# Patient Record
Sex: MALE | Race: WHITE | Employment: FULL TIME | URBAN - METROPOLITAN AREA
[De-identification: names, ages, dates, MRNs, and addresses within clinical notes are randomized per-mention and may not be internally consistent; named-entity substitution may affect disease eponyms.]

---

## 2017-05-01 ENCOUNTER — OFFICE VISIT (OUTPATIENT)
Dept: URGENT CARE | Facility: RETAIL CLINIC | Age: 42
End: 2017-05-01
Payer: COMMERCIAL

## 2017-05-01 VITALS
SYSTOLIC BLOOD PRESSURE: 129 MMHG | HEART RATE: 86 BPM | DIASTOLIC BLOOD PRESSURE: 70 MMHG | OXYGEN SATURATION: 97 % | TEMPERATURE: 98.4 F

## 2017-05-01 DIAGNOSIS — L23.5 ALLERGIC DERMATITIS DUE TO OTHER CHEMICAL PRODUCT: Primary | ICD-10-CM

## 2017-05-01 PROCEDURE — 99213 OFFICE O/P EST LOW 20 MIN: CPT | Performed by: NURSE PRACTITIONER

## 2017-05-01 RX ORDER — PREDNISONE 20 MG/1
TABLET ORAL
Qty: 21 TABLET | Refills: 0 | Status: SHIPPED | OUTPATIENT
Start: 2017-05-01 | End: 2017-05-29

## 2017-05-01 NOTE — MR AVS SNAPSHOT
"              After Visit Summary   2017    Aleks Sumner    MRN: [id number]           Patient Information     Date Of Birth          1975        Visit Information        Provider Department      2017 5:40 PM Ned Camara APRN [facility]        Today's Diagnoses     Allergic dermatitis due to other chemical product    -  1       Follow-ups after your visit        Who to contact     You can reach your care team any time of the day by calling [phone].  Notification of test results:  If you have an abnormal lab result, we will notify you by phone as soon as possible.         Additional Information About Your Visit        MyChart Information     [employer] lets you send messages to your doctor, view your test results, renew your prescriptions, schedule appointments and more. To sign up, go to [email or web address] . Click on \"Log in\" on the left side of the screen, which will take you to the Welcome page. Then click on \"Sign up Now\" on the right side of the page.     You will be asked to enter the access code listed below, as well as some personal information. Please follow the directions to create your username and password.     Your access code is: [email or web address]  Expires: 2017  6:55 PM     Your access code will  in 90 days. If you need help or a new code, please call your [place] clinic or [phone].        Care EveryWhere ID     This is your [facility] EveryWhere ID. This could be used by other organizations to access your [place] medical records  [id number]        Your Vitals Were     Pulse Temperature Pulse Oximetry             86 98.4  F (36.9  C) (Oral) 97%          Blood Pressure from Last 3 Encounters:   17 129/70   16 130/76   16 114/68    Weight from Last 3 Encounters:   16 180 lb 9.6 oz (81.9 kg)   16 172 lb (78 kg)   05/14/15 177 lb (80.3 kg)              Today, you had the following     No orders found for " display         Today's Medication Changes          These changes are accurate as of: 5/1/17  6:55 PM.  If you have any questions, ask your nurse or doctor.               Start taking these medicines.        Dose/Directions    predniSONE 20 MG tablet   Commonly known as:  DELTASONE   Used for:  Allergic dermatitis due to other chemical product   Started by:  Ned Camara APRN CNP        Take 3 tablets for 3 days, then take 2 tablets for 3 days, then 1 tablet for 4 days, then 1/2 a tablet for 4 days.   Quantity:  21 tablet   Refills:  0            Where to get your medicines      These medications were sent to [address]  [address], [facility] [address]     Phone:  [phone]     predniSONE 20 MG tablet                Primary Care Provider Office Phone # Fax #    Beni Murrieta [phone]       [facility] [address] [place] [address]        Thank you!     Thank you for choosing [facility]  for your care. Our goal is always to provide you with excellent care. Hearing back from our patients is one way we can continue to improve our services. Please take a few minutes to complete the written survey that you may receive in the mail after your visit with us. Thank you!             Your Updated Medication List - Protect others around you: Learn how to safely use, store and throw away your medicines at www.disposemymeds.org.          This list is accurate as of: 5/1/17  6:55 PM.  Always use your most recent med list.                   Brand Name Dispense Instructions for use    predniSONE 20 MG tablet    DELTASONE    21 tablet    Take 3 tablets for 3 days, then take 2 tablets for 3 days, then 1 tablet for 4 days, then 1/2 a tablet for 4 days.

## 2017-05-01 NOTE — LETTER
[facility]  [address]  [facility] [address]  Phone: [phone]    May 1, 2017        Aleks Sumner  [address]  [facility] [address]          To whom it may concern:    RE: Aleks Sumner    Patient was seen and treated today at our clinic.  Patient is very likely having a reaction to a chemical exposure and needs to wear protection (physical & respiratory) if he is to continue working with this exposure.     Please contact me for questions or concerns.      Sincerely,        Ned Camara MSN, APRN, Family NP-C  [facility]

## 2017-05-01 NOTE — NURSING NOTE
"Chief Complaint   Patient presents with     Derm Problem     rash on arms for about a week       Initial /70  Pulse 86  Temp 98.4  F (36.9  C) (Oral)  SpO2 97% Estimated body mass index is 25.19 kg/(m^2) as calculated from the following:    Height as of 5/12/16: 5' 11\" (1.803 m).    Weight as of 5/12/16: 180 lb 9.6 oz (81.9 kg).  Medication Reconciliation: complete   Ria Durham      "

## 2017-05-01 NOTE — PROGRESS NOTES
[facility] note    SUBJECTIVE:  Aleks Sumner is a 41 year old male who presents to [facility] with chief complaint of a rash.  Onset of rash was 1 week(s) ago.   Rash is gradual onset and worsening.  Location of the rash: arms and face (eyelids were even swollen, mild in a few other areas.  Quality/symptoms of rash: itching and redness   Symptoms are mild and moderate and rash seems to be worsening.  Previous history of a similar rash? No  Recent exposure history: chemical exposure.    Associated symptoms include: itches & seems to be spreading.    No current outpatient prescriptions on file.     No current facility-administered medications for this visit.        PAST MEDICAL HISTORY: No past medical history on file.    PAST SURGICAL HISTORY:   Past Surgical History:   Procedure Laterality Date     APPENDECTOMY         FAMILY HISTORY:   Family History   Problem Relation Age of Onset     Depression Mother      HEART DISEASE Maternal Grandfather        SOCIAL HISTORY:   Social History   Substance Use Topics     Smoking status: Current Every Day Smoker     Packs/day: 1.00     Types: Cigarettes     Smokeless tobacco: Former User     Alcohol use 0.0 oz/week     0 Standard drinks or equivalent per week      Comment: socially, not heavy         ROS:  Review of systems negative except as stated above.    EXAM:   Vitals:    05/01/17 1813   BP: 129/70   Pulse: 86   Temp: 98.4  F (36.9  C)   TempSrc: Oral   SpO2: 97%     GENERAL: alert, no acute distress.  SKIN: Rash description:    Distribution: generalized  Location: mostly arms & face (including eye lids) and mild other areas.    Color: red,  Lesion type: maculopapular.  GENERAL APPEARANCE: alert, moderate distress and cooperative  EYES: EOMI,  PERRL, conjunctiva clear  NECK: supple, non-tender to palpation, no adenopathy noted    ASSESSMENT:  Allergic dermatitis due to other chemical  product      PLAN:  Prednisolone   Treatment of pruritus can be difficult and often frustrating. Specific treatments exist for some, but not all.  Antihistamines are the most widely utilized agents for pruritus. (such as Benadryl & Zyrtec).  Appropriate skin care is imperative.  Avoidance of scratching, and therefore secondary skin irritation and perpetuation of the itch-scratch cycle, is also important.    Avoidance of contact irritants such as wool clothing, cleansing agents, and pet dander may be helpful.    Many forms of pruritus can be worsened by dry skin and may improve with treatment for dry skin, including use of a humidifier, maintaining a cool environment, avoiding hot baths/showers, and using only mild soaps.      Lubrication options discussed.    Topical antipruritics such as a camphor-based lotion or oatmeal baths may offer temporary relief.  OTC Treatments were reviewed with Aleks Sumner  Patient informed to F/U with PCP if symptoms worsen or do not resolve.    Information on the above diagnosis was given to the patient.  Observe for signs of superimposed infection and systemic symptoms  Watch for signs of fever or worsening of the rash.    If not improving Follow up at:  [facility] [phone]    Ned Camara MSN, APRN, Family NP-C  [facility]

## 2017-05-29 ENCOUNTER — HOSPITAL ENCOUNTER (EMERGENCY)
Facility: CLINIC | Age: 42
Discharge: HOME | End: 2017-05-29
Attending: PHYSICIAN ASSISTANT | Admitting: PHYSICIAN ASSISTANT
Payer: COMMERCIAL

## 2017-05-29 VITALS
HEART RATE: 57 BPM | RESPIRATION RATE: 12 BRPM | TEMPERATURE: 98 F | BODY MASS INDEX: 23.7 KG/M2 | OXYGEN SATURATION: 98 % | DIASTOLIC BLOOD PRESSURE: 71 MMHG | HEIGHT: 72 IN | WEIGHT: 175 LBS | SYSTOLIC BLOOD PRESSURE: 124 MMHG

## 2017-05-29 DIAGNOSIS — Z23 NEED FOR VACCINATION: ICD-10-CM
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
## 2017-05-29 DIAGNOSIS — S81.011A: ICD-10-CM

## 2017-05-29 PROCEDURE — 90471 IMMUNIZATION ADMIN: CPT | Performed by: PHYSICIAN ASSISTANT

## 2017-05-29 PROCEDURE — 99283 EMERGENCY DEPT VISIT LOW MDM: CPT | Mod: 25 | Performed by: PHYSICIAN ASSISTANT

## 2017-05-29 PROCEDURE — 90715 TDAP VACCINE 7 YRS/> IM: CPT | Performed by: PHYSICIAN ASSISTANT

## 2017-05-29 PROCEDURE — 12001 RPR S/N/AX/GEN/TRNK 2.5CM/<: CPT | Mod: Z6 | Performed by: PHYSICIAN ASSISTANT

## 2017-05-29 PROCEDURE — 12001 RPR S/N/AX/GEN/TRNK 2.5CM/<: CPT | Performed by: PHYSICIAN ASSISTANT

## 2017-05-29 PROCEDURE — 99283 EMERGENCY DEPT VISIT LOW MDM: CPT | Mod: Z6 | Performed by: PHYSICIAN ASSISTANT

## 2017-05-29 PROCEDURE — 25000125 ZZHC RX 250: Performed by: PHYSICIAN ASSISTANT

## 2017-05-29 RX ORDER — LIDOCAINE HYDROCHLORIDE AND EPINEPHRINE 10; 10 MG/ML; UG/ML
1 INJECTION, SOLUTION INFILTRATION; PERINEURAL ONCE
Status: COMPLETED | OUTPATIENT
Start: 2017-05-29 | End: 2017-05-29

## 2017-05-29 RX ADMIN — CLOSTRIDIUM TETANI TOXOID ANTIGEN (FORMALDEHYDE INACTIVATED), CORYNEBACTERIUM DIPHTHERIAE TOXOID ANTIGEN (FORMALDEHYDE INACTIVATED), BORDETELLA PERTUSSIS TOXOID ANTIGEN (GLUTARALDEHYDE INACTIVATED), BORDETELLA PERTUSSIS FILAMENTOUS HEMAGGLUTININ ANTIGEN (FORMALDEHYDE INACTIVATED), BORDETELLA PERTUSSIS PERTACTIN ANTIGEN, AND BORDETELLA PERTUSSIS FIMBRIAE 2/3 ANTIGEN 0.5 ML: 5; 2; 2.5; 5; 3; 5 INJECTION, SUSPENSION INTRAMUSCULAR at 14:22

## 2017-05-29 RX ADMIN — LIDOCAINE HYDROCHLORIDE,EPINEPHRINE BITARTRATE 1 ML: 10; .01 INJECTION, SOLUTION INFILTRATION; PERINEURAL at 14:21

## 2017-05-29 ASSESSMENT — ENCOUNTER SYMPTOMS
WEAKNESS: 0
NUMBNESS: 0
ARTHRALGIAS: 0
WOUND: 1

## 2017-05-29 NOTE — ED AVS SNAPSHOT
[facility] Emergency Department    [address] [place] [address]    Phone:  [phone]    Fax:  [phone]                                       Aleks Sumner   MRN: [id number]    Department:  [facility] Emergency Department   Date of Visit:  5/29/2017           Patient Information     Date Of Birth          1975        Your diagnoses for this visit were:     Laceration of skin of right knee without complication, initial encounter        You were seen by Roxanne Espitia PA-C.      Follow-up Information     Follow up with Beni Murrieta MD In 8 days.    Specialty:  Family Practice    Why:  For suture removal    Contact information:    [facility]  [address] [place] [address] [phone]          Follow up with [facility] Emergency Department.    Specialty:  EMERGENCY MEDICINE    Why:  If symptoms worsen    Contact information:    [address] [place] [address] [phone]    Additional information:    From [address]: Exit at [address] on [address] side of [place]. Turn right on [address]. Turn left at stoplight on [address] [place]. [facility] will be in view two blocks ahead        Discharge Instructions       Keep the wound clean, dry, covered. Have the sutures removed in 8-10 days. Use ibuprofen or tylenol if you develop pain. Return to the emergency department if you develop signs of infection or if wound breaks open.    Thank you for choosing [facility]'s Emergency Department. It was a pleasure taking care of you today. If you have any questions, please call [phone].    Roxanne Espitia PA-C    Extremity Laceration: Sutures, Staples, or Tape  A laceration is a cut through the skin. If it is deep, it may require stitches (sutures) or staples to close so it can heal. Minor cuts may be treated with surgical tape closures.   X-rays may be done if something may have  entered the skin through the cut. You may also need a tetanus shot if you are not up to date on this vaccination.  Home care    Follow the health care provider s instructions on how to care for the cut.    Wash your hands with soap and warm water before and after caring for your wound. This is to help prevent infection.    Keep the wound clean and dry. If a bandage was applied and it becomes wet or dirty, replace it. Otherwise, leave it in place for the first 24 hours, then change it once a day or as directed.    If sutures or staples were used, clean the wound daily:    After removing the bandage, wash the area with soap and water. Use a wet cotton swab to loosen and remove any blood or crust that forms.    After cleaning, keep the wound clean and dry. Talk with your doctor before applying any antibiotic ointment to the wound. Reapply the bandage.    You may remove the bandage to shower as usual after the first 24 hours, but do not soak the area in water (no swimming) until the stitches or staples are removed.    If surgical tape closures were used, keep the area clean and dry. If it becomes wet, blot it dry with a towel.    The doctor may prescribe an antibiotic cream or ointment to prevent infection. Do not stop taking this medication until you have finished the prescribed course or the doctor tells you to stop. The doctor may also prescribe medications for pain. Follow the doctor s instructions for taking these medications.    Avoid activities that may reopen your wound.  Follow-up care  Follow up with your health care provider. Most skin wounds heal within ten days. However, an infection may sometimes occur despite proper treatment. Therefore, check the wound daily for the signs of infection listed below. Stitches and staples should be removed within 7-14 days. If surgical tape closures were used, you may remove them after 10 days if they have not fallen off by then.   When to seek medical advice  Call your  health care provider right away if any of these occur:    Wound bleeding not controlled by direct pressure    Signs of infection, including increasing pain in the wound, increasing wound redness or swelling, or pus or bad odor coming from the wound    Fever of 100.4 F (38 C) or higher or as directed by your healthcare provider    Stitches or staples come apart or fall out or surgical tape falls off before 7 days    Wound edges re-open    Wound changes colors    Numbness around the wound     Decreased movement around the injured area        24 Hour Appointment Hotline       To make an appointment at any [facility] clinic, call [phone] ([phone]). If you don't have a family doctor or clinic, we will help you find one. [facility] clinics are conveniently located to serve the needs of you and your family.             Review of your medicines      Notice     You have not been prescribed any medications.            Orders Needing Specimen Collection     None      Pending Results     No orders found from 5/27/2017 to 5/30/2017.            Pending Culture Results     No orders found from 5/27/2017 to 5/30/2017.            Pending Results Instructions     If you had any lab results that were not finalized at the time of your Discharge, you can call the ED Lab Result RN at [phone]. You will be contacted by this team for any positive Lab results or changes in treatment. The nurses are available 7 days a week from 10A to 6:30P.  You can leave a message 24 hours per day and they will return your call.        Thank you for choosing [facility]       Thank you for choosing [facility] for your care. Our goal is always to provide you with excellent care. Hearing back from our patients is one way we can continue to improve our services. Please take a few minutes to complete the written survey that you may receive in the mail after you visit with us. Thank you!        [employer] [facility] Information     [employer] lets you send messages to  "your doctor, view your test results, renew your prescriptions, schedule appointments and more. To sign up, go to [email or web address] . Click on \"Log in\" on the left side of the screen, which will take you to the Welcome page. Then click on \"Sign up Now\" on the right side of the page.     You will be asked to enter the access code listed below, as well as some personal information. Please follow the directions to create your username and password.     Your access code is: [email or web address]  Expires: 2017  6:55 PM     Your access code will  in 90 days. If you need help or a new code, please call your [place] clinic or [phone].        Care EveryWhere ID     This is your Care EveryWhere ID. This could be used by other organizations to access your [place] medical records  [id number]        After Visit Summary       This is your record. Keep this with you and show to your community pharmacist(s) and doctor(s) at your next visit.                  "

## 2017-05-29 NOTE — DISCHARGE INSTRUCTIONS
Keep the wound clean, dry, covered. Have the sutures removed in 8-10 days. Use ibuprofen or tylenol if you develop pain. Return to the emergency department if you develop signs of infection or if wound breaks open.    Thank you for choosing [facility]'s Emergency Department. It was a pleasure taking care of you today. If you have any questions, please call [phone].    Roxanne Espitia PA-C    Extremity Laceration: Sutures, Staples, or Tape  A laceration is a cut through the skin. If it is deep, it may require stitches (sutures) or staples to close so it can heal. Minor cuts may be treated with surgical tape closures.   X-rays may be done if something may have entered the skin through the cut. You may also need a tetanus shot if you are not up to date on this vaccination.  Home care    Follow the health care provider s instructions on how to care for the cut.    Wash your hands with soap and warm water before and after caring for your wound. This is to help prevent infection.    Keep the wound clean and dry. If a bandage was applied and it becomes wet or dirty, replace it. Otherwise, leave it in place for the first 24 hours, then change it once a day or as directed.    If sutures or staples were used, clean the wound daily:    After removing the bandage, wash the area with soap and water. Use a wet cotton swab to loosen and remove any blood or crust that forms.    After cleaning, keep the wound clean and dry. Talk with your doctor before applying any antibiotic ointment to the wound. Reapply the bandage.    You may remove the bandage to shower as usual after the first 24 hours, but do not soak the area in water (no swimming) until the stitches or staples are removed.    If surgical tape closures were used, keep the area clean and dry. If it becomes wet, blot it dry with a towel.    The doctor may prescribe an antibiotic cream or ointment to prevent infection. Do not stop taking this medication until you  have finished the prescribed course or the doctor tells you to stop. The doctor may also prescribe medications for pain. Follow the doctor s instructions for taking these medications.    Avoid activities that may reopen your wound.  Follow-up care  Follow up with your health care provider. Most skin wounds heal within ten days. However, an infection may sometimes occur despite proper treatment. Therefore, check the wound daily for the signs of infection listed below. Stitches and staples should be removed within 7-14 days. If surgical tape closures were used, you may remove them after 10 days if they have not fallen off by then.   When to seek medical advice  Call your health care provider right away if any of these occur:    Wound bleeding not controlled by direct pressure    Signs of infection, including increasing pain in the wound, increasing wound redness or swelling, or pus or bad odor coming from the wound    Fever of 100.4 F (38 C) or higher or as directed by your healthcare provider    Stitches or staples come apart or fall out or surgical tape falls off before 7 days    Wound edges re-open    Wound changes colors    Numbness around the wound     Decreased movement around the injured area

## 2017-05-29 NOTE — ED AVS SNAPSHOT
[facility] Emergency Department    [address] [place] [address]    Phone:  [phone]    Fax:  [phone]                                       Aleks Sumner   MRN: [id number]    Department:  [facility] Emergency Department   Date of Visit:  5/29/2017           After Visit Summary Signature Page     I have received my discharge instructions, and my questions have been answered. I have discussed any challenges I see with this plan with the nurse or doctor.    ..........................................................................................................................................  Patient/Patient Representative Signature      ..........................................................................................................................................  Patient Representative Print Name and Relationship to Patient    ..................................................               ................................................  Date                                            Time    ..........................................................................................................................................  Reviewed by Signature/Title    ...................................................              ..............................................  Date                                                            Time

## 2017-05-29 NOTE — ED PROVIDER NOTES
History     Chief Complaint   Patient presents with     Laceration     HPI  Aleks Sumner is a 41 year old male who resents to the emergency department with a laceration to his right leg.  He was at home working outside on yard chores when he accidentally cut himself with his utility knife.  Bleeding was controlled with direct pressure.  Laceration is located just superior to his right knee.  He reports full mobility of the knee without pain. Last tetanus 2008.    I have reviewed the Medications, Allergies, Past Medical and Surgical History, and Social History in the Epic system.    Review of Systems   Musculoskeletal: Negative for arthralgias.   Skin: Positive for wound (right knee).   Neurological: Negative for weakness and numbness.   All other systems reviewed and are negative.      Physical Exam   BP: 124/71  Pulse: 57  Temp: 98  F (36.7  C)  Resp: 12  Height: 182.9 cm (6')  Weight: 79.4 kg (175 lb)  SpO2: 98 %  Physical Exam   Constitutional: He is oriented to person, place, and time. He appears well-developed and well-nourished. No distress.   HENT:   Head: Normocephalic and atraumatic.   Cardiovascular: Intact distal pulses.    Pulmonary/Chest: Effort normal. No respiratory distress.   Musculoskeletal:        Right knee: He exhibits laceration. He exhibits normal range of motion (full strength of knee, full ROM without pain) and no effusion. No tenderness found.        Legs:  Neurological: He is alert and oriented to person, place, and time.   Skin: Skin is warm and dry. He is not diaphoretic.   Psychiatric: He has a normal mood and affect.       ED Course     ED Course     Laceration repair  Date/Time: 5/29/2017 2:50 PM  Performed by: BRAD TATUM  Authorized by: BRAD TATUM   Consent: Verbal consent obtained.  Consent given by: patient  Body area: lower extremity  Location details: left knee  Laceration length: 2 cm  Foreign bodies: no foreign bodies  Tendon involvement:  none  Nerve involvement: none  Anesthesia: local infiltration    Anesthesia:  Anesthesia: local infiltration  Local Anesthetic: lidocaine 1% with epinephrine   Anesthetic total: 2 mL  Sedation:  Patient sedated: no    Preparation: Patient was prepped and draped in the usual sterile fashion.  Irrigation solution: saline  Amount of cleaning: standard  Debridement: none  Degree of undermining: none  Skin closure: Ethilon (4-0)  Number of sutures: 5  Technique: simple  Approximation: close  Approximation difficulty: simple  Dressing: 4x4 sterile gauze  Patient tolerance: Patient tolerated the procedure well with no immediate complications            Medications   lidocaine 1% with EPINEPHrine 1:100,000 injection 1 mL (1 mL Intradermal Given by Other 5/29/17 7979)   Tdap (tetanus-diphtheria-acell pertussis) (ADACEL) injection 0.5 mL (0.5 mLs Intramuscular Given 5/29/17 3772)        Assessments & Plan (with Medical Decision Making)  Aleks Sumner is a 41 year old male who presented to the emergency department with a laceration to his right leg.  He accidentally cut himself with a utility knife while working on [employer] for a garden. Bleeding was controlled prior to arrival.  On exam he had a 2 cm laceration just superior to his anterior/medial knee.  He had no evidence for joint involvement or tendon involvement.  He was neurovascularly intact distal to the injury.  This did require repair and this was performed as detailed above.  I advised to have the sutures removed in 8-10 days in the clinic.  I discussed wound cares with the patient and indications of when he should return to the emergency department.  All questions were answered and the patient was comfortable with discharge at this time.       I have reviewed the nursing notes.    I have reviewed the findings, diagnosis, plan and need for follow up with the patient.    New Prescriptions    No medications on file       Final diagnoses:   Laceration of skin  of right knee without complication, initial encounter       5/29/2017   [facility] EMERGENCY DEPARTMENT     Roxanne Espitia PA-C  05/29/17 2332

## 2018-10-15 ENCOUNTER — OFFICE VISIT (OUTPATIENT)
Dept: FAMILY MEDICINE | Facility: CLINIC | Age: 43
End: 2018-10-15
Payer: COMMERCIAL

## 2018-10-15 VITALS
HEIGHT: 71 IN | HEART RATE: 87 BPM | OXYGEN SATURATION: 98 % | BODY MASS INDEX: 23.29 KG/M2 | DIASTOLIC BLOOD PRESSURE: 70 MMHG | WEIGHT: 166.38 LBS | TEMPERATURE: 97.7 F | RESPIRATION RATE: 16 BRPM | SYSTOLIC BLOOD PRESSURE: 122 MMHG

## 2018-10-15 DIAGNOSIS — M67.442 GANGLION CYST OF FINGER OF LEFT HAND: ICD-10-CM

## 2018-10-15 DIAGNOSIS — Z13.220 LIPID SCREENING: ICD-10-CM

## 2018-10-15 DIAGNOSIS — Z13.1 SCREENING FOR DIABETES MELLITUS: ICD-10-CM

## 2018-10-15 DIAGNOSIS — R20.2 PARESTHESIA OF RIGHT ARM: ICD-10-CM

## 2018-10-15 DIAGNOSIS — Z00.00 ENCOUNTER FOR ROUTINE ADULT HEALTH EXAMINATION WITHOUT ABNORMAL FINDINGS: Primary | ICD-10-CM

## 2018-10-15 LAB
CHOLEST SERPL-MCNC: 161 MG/DL
GLUCOSE SERPL-MCNC: 55 MG/DL (ref 70–99)
HDLC SERPL-MCNC: 62 MG/DL
LDLC SERPL CALC-MCNC: 74 MG/DL
NONHDLC SERPL-MCNC: 99 MG/DL
TRIGL SERPL-MCNC: 124 MG/DL

## 2018-10-15 PROCEDURE — 82947 ASSAY GLUCOSE BLOOD QUANT: CPT | Performed by: FAMILY MEDICINE

## 2018-10-15 PROCEDURE — 80061 LIPID PANEL: CPT | Performed by: FAMILY MEDICINE

## 2018-10-15 PROCEDURE — 36415 COLL VENOUS BLD VENIPUNCTURE: CPT | Performed by: FAMILY MEDICINE

## 2018-10-15 PROCEDURE — 99213 OFFICE O/P EST LOW 20 MIN: CPT | Mod: 25 | Performed by: FAMILY MEDICINE

## 2018-10-15 PROCEDURE — 99396 PREV VISIT EST AGE 40-64: CPT | Performed by: FAMILY MEDICINE

## 2018-10-15 ASSESSMENT — PAIN SCALES - GENERAL: PAINLEVEL: NO PAIN (0)

## 2018-10-15 NOTE — MR AVS SNAPSHOT
After Visit Summary   10/15/2018    Aleks Sumner    MRN: [id number]           Patient Information     Date Of Birth          1975        Visit Information        Provider Department      10/15/2018 4:20 PM Beni Murrieta MD [facility]        Today's Diagnoses     Encounter for routine adult health examination without abnormal findings    -  1    Lipid screening        Screening for diabetes mellitus        Paresthesia of right arm        Ganglion cyst of finger of left hand          Care Instructions      Preventive Health Recommendations  Male Ages 40 to 49    Yearly exam:             See your health care provider every year in order to  o   Review health changes.   o   Discuss preventive care.    o   Review your medicines if your doctor has prescribed any.    You should be tested each year for STDs (sexually transmitted diseases) if you re at risk.     Have a cholesterol test every 5 years.     Have a colonoscopy (test for colon cancer) if someone in your family has had colon cancer or polyps before age 50.     After age 45, have a diabetes test (fasting glucose). If you are at risk for diabetes, you should have this test every 3 years.      Talk with your health care provider about whether or not a prostate cancer screening test (PSA) is right for you.    Shots: Get a flu shot each year. Get a tetanus shot every 10 years.     Nutrition:    Eat at least 5 servings of fruits and vegetables daily.     Eat whole-grain bread, whole-wheat pasta and brown rice instead of white grains and rice.     Get adequate Calcium and Vitamin D.     Lifestyle    Exercise for at least 150 minutes a week (30 minutes a day, 5 days a week). This will help you control your weight and prevent disease.     Limit alcohol to one drink per day.     No smoking.     Wear sunscreen to prevent skin cancer.     See your dentist every six months for an exam and cleaning.              Follow-ups  after your visit        Additional Services     ORTHOPEDICS ADULT REFERRAL       Your provider has referred you to: [facility]: [facility] [phone]   [email or web address]    Please be aware that coverage of these services is subject to the terms and limitations of your health insurance plan.  Call member services at your health plan with any benefit or coverage questions.      Please bring the following to your appointment:    >>   Any x-rays, CTs or MRIs which have been performed.  Contact the facility where they were done to arrange for  prior to your scheduled appointment.    >>   List of current medications   >>   This referral request   >>   Any documents/labs given to you for this referral                  Your next 10 appointments already scheduled     Oct 22, 2018  2:40 PM CDT   New Visit with Denny Moreira MD   [facility]    [address] [place] [id number] [phone]              Who to contact     If you have questions or need follow up information about today's clinic visit or your schedule please contact [facility] directly at [phone].  Normal or non-critical lab and imaging results will be communicated to you by MyChart, letter or phone within 4 business days after the clinic has received the results. If you do not hear from us within 7 days, please contact the clinic through MyChart or phone. If you have a critical or abnormal lab result, we will notify you by phone as soon as possible.  Submit refill requests through [employer] or call your pharmacy and they will forward the refill request to us. Please allow 3 business days for your refill to be completed.          Additional Information About Your Visit        Care EveryWhere ID     This is your Care EveryWhere ID. This could be used by other organizations to access your [facility]  "records  [id number]        Your Vitals Were     Pulse Temperature Respirations Height Pulse Oximetry BMI (Body Mass Index)    87 97.7  F (36.5  C) (Temporal) 16 5' 11\" (1.803 m) 98% 23.2 kg/m2       Blood Pressure from Last 3 Encounters:   10/15/18 122/70   05/29/17 124/71   05/01/17 129/70    Weight from Last 3 Encounters:   10/15/18 166 lb 6 oz (75.5 kg)   05/29/17 175 lb (79.4 kg)   05/12/16 180 lb 9.6 oz (81.9 kg)              We Performed the Following     GLUCOSE     Lipid panel reflex to direct LDL Non-fasting     ORTHOPEDICS ADULT REFERRAL        Primary Care Provider Office Phone # Fax #    Beni Murrieta [phone]       [address] [place] [address]        Equal Access to Services     [facility]: Hadii aad ku hadasho Soomaali, waaxda luqadaha, qaybta kaalmada adeegyada, waxay trippin haylocn tori garcia laBranaan . So [facility] [phone].    ATENCIÓN: Si habla español, tiene a blanc disposición servicios gratuitos de asistencia lingüística. Chalo al [phone].    We comply with applicable federal civil rights laws and [place] laws. We do not discriminate on the basis of race, color, national origin, age, disability, sex, sexual orientation, or gender identity.            Thank you!     Thank you for choosing [facility]  for your care. Our goal is always to provide you with excellent care. Hearing back from our patients is one way we can continue to improve our services. Please take a few minutes to complete the written survey that you may receive in the mail after your visit with us. Thank you!             Your Updated Medication List - Protect others around you: Learn how to safely use, store and throw away your medicines at www.disposemymeds.org.      Notice  As of 10/15/2018 11:59 PM    You have not been prescribed any medications.      "

## 2018-10-15 NOTE — PROGRESS NOTES
SUBJECTIVE:   CC: Aleks Sumner is an 43 year old male who presents for preventative health visit.     Healthy Habits:    Do you get at least three servings of calcium containing foods daily (dairy, green leafy vegetables, etc.)? yes and no, taking calcium and/or vitamin D supplement: no    Amount of exercise or daily activities, outside of work: 5 day(s) per week    Problems taking medications regularly No    Medication side effects: No    Have you had an eye exam in the past two years? yes    Do you see a dentist twice per year? yes    Do you have sleep apnea, excessive snoring or daytime drowsiness?no     Patient has been having a burning sensation around his elbows and forearm area for maybe 6 months   PROBLEMS TO ADD ON...  Some burning sensation in his forearms bilaterally right worse than the left.      Today's PHQ-2 Score:   PHQ-2 ( 1999 [employer]) 5/14/2015 9/3/2013   Q1: Little interest or pleasure in doing things 0 0   Q2: Feeling down, depressed or hopeless 0 0   PHQ-2 Score 0 0       Abuse: Current or Past(Physical, Sexual or Emotional)- No  Do you feel safe in your environment - Yes    Social History   Substance Use Topics     Smoking status: Current Every Day Smoker     Packs/day: 1.00     Types: Cigarettes     Smokeless tobacco: Former User     Alcohol use 0.0 oz/week     0 Standard drinks or equivalent per week      Comment: socially, not heavy      If you drink alcohol do you typically have >3 drinks per day or >7 drinks per week? No                      Last PSA: No results found for: PSA    Reviewed orders with patient. Reviewed health maintenance and updated orders accordingly - Yes  Labs reviewed in EPIC  BP Readings from Last 3 Encounters:   10/15/18 122/70   05/29/17 124/71   05/01/17 129/70    Wt Readings from Last 3 Encounters:   10/15/18 166 lb 6 oz (75.5 kg)   05/29/17 175 lb (79.4 kg)   05/12/16 180 lb 9.6 oz (81.9 kg)                  Patient Active Problem List   Diagnosis      Tobacco use disorder     Past Surgical History:   Procedure Laterality Date     APPENDECTOMY  1983    ruptured appy       Social History   Substance Use Topics     Smoking status: Current Every Day Smoker     Packs/day: 1.00     Types: Cigarettes     Smokeless tobacco: Former User     Alcohol use 0.0 oz/week     0 Standard drinks or equivalent per week      Comment: socially, not heavy     Family History   Problem Relation Age of Onset     Depression Mother      65     HEART DISEASE Maternal Grandfather      Other - See Comments Father      66     Other - See Comments Sister      3 yrs younger     Diabetes Paternal Uncle      Obesity Paternal Uncle      Other - See Comments Sister      10 yrs younger     Other - See Comments Daughter      2010     Other - See Comments Son      2011     Other - See Comments Son      2013     Alzheimer Disease Maternal Grandmother      Alzheimer Disease Paternal Grandmother      Other - See Comments Daughter      2001, from another marriage         No current outpatient prescriptions on file.     No Known Allergies  Recent Labs   Lab Test  10/15/18   1728   LDL  74   HDL  62   TRIG  124        Reviewed and updated as needed this visit by clinical staff  Tobacco  Allergies  Med Hx  Surg Hx  Fam Hx  Soc Hx        Reviewed and updated as needed this visit by Provider        Past Medical History:   Diagnosis Date     NO ACTIVE PROBLEMS       Past Surgical History:   Procedure Laterality Date     APPENDECTOMY  1983    ruptured appy       ROS:  CONSTITUTIONAL: NEGATIVE for fever, chills, change in weight  INTEGUMENTARY/SKIN: NEGATIVE for worrisome rashes, moles or lesions  EYES: NEGATIVE for vision changes or irritation  ENT: NEGATIVE for ear, mouth and throat problems  RESP: NEGATIVE for significant cough or SOB  CV: NEGATIVE for chest pain, palpitations or peripheral edema  GI: NEGATIVE for nausea, abdominal pain, heartburn, or change in bowel habits   male: negative for  "dysuria, hematuria, decreased urinary stream, erectile dysfunction, urethral discharge  MUSCULOSKELETAL: He notes the burning sensation in his right upper arm that he gets at work.  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  At times he feels like his strength is a little bit decreased particularly with grasp but he has not been dropping any equipment or tools while at work.  Is fairly sporadic has not been a problem up to this point.  Occasionally but very rarely he will get similar symptoms on the left.  Typically they start right around the elbow region and can be along the forearm and even up into the upper arm.   NEURO: NEGATIVE for weakness, dizziness or paresthesias  PSYCHIATRIC: NEGATIVE for changes in mood or affect    OBJECTIVE:   /70 (BP Location: Left arm, Patient Position: Sitting, Cuff Size: Adult Regular)  Pulse 87  Temp 97.7  F (36.5  C) (Temporal)  Resp 16  Ht 5' 11\" (1.803 m)  Wt 166 lb 6 oz (75.5 kg)  SpO2 98%  BMI 23.2 kg/m2  EXAM:  GENERAL: healthy, alert and no distress  EYES: Eyes grossly normal to inspection, PERRL and conjunctivae and sclerae normal  HENT: ear canals and TM's normal, nose and mouth without ulcers or lesions  NECK: no adenopathy, no asymmetry, masses, or scars and thyroid normal to palpation  RESP: lungs clear to auscultation - no rales, rhonchi or wheezes  CV: regular rate and rhythm, normal S1 S2, no S3 or S4, no murmur, click or rub, no peripheral edema and peripheral pulses strong  ABDOMEN: soft, nontender, no hepatosplenomegaly, no masses and bowel sounds normal   (male): normal male genitalia without lesions or urethral discharge, no hernia  RECTAL: not indicated   MS: There is no obvious deformities or reproducible pain in the upper extremities bilaterally.  He has normal hand grasp and has a negative Tinel and Phalen bilaterally.  He does have a small cyst that is 3 or 4 mm in diameter on the left hand long finger " just distal to the PIP joint on the flexor surface.  There is no erythema.  This is just alongside it was an old scar from where he had injured this finger and had a repair done.  It may be a cyst along the flexor tendon but it really does not move when he flexes the finger.  SKIN: no suspicious lesions or rashes  NEURO: Normal strength and tone, mentation intact and speech normal  PSYCH: mentation appears normal, affect normal/bright    Diagnostic Test Results:  Results for orders placed or performed in visit on 10/15/18 (from the past 24 hour(s))   GLUCOSE   Result Value Ref Range    Glucose 55 (L) 70 - 99 mg/dL   Lipid panel reflex to direct LDL Non-fasting   Result Value Ref Range    Cholesterol 161 <200 mg/dL    Triglycerides 124 <150 mg/dL    HDL Cholesterol 62 >39 mg/dL    LDL Cholesterol Calculated 74 <100 mg/dL    Non HDL Cholesterol 99 <130 mg/dL       ASSESSMENT/PLAN:   (Z00.00) Encounter for routine adult health examination without abnormal findings  (primary encounter diagnosis)  Comment: Overall doing well.  He has lost some weight over the years and is got a normal BMI at this time.  He eats very well including a lot of fresh vegetables and fruits.  He drinks occasionally.  Plan: Encouraged him to continue with the increase fruits and vegetables.  He should not need any other dietary supplements since he is getting a good mix of foods in his diet.  He had asked specifically about a men's vitamin but I do not think it is needed at this time.    (Z13.220) Lipid screening  Comment: He is due for lipid screening even though he is not fasting today we obtained a nonfasting lab  Plan: Lipid panel reflex to direct LDL Non-fasting        His cholesterol profile is excellent can recheck in 5 years.    (Z13.1) Screening for diabetes mellitus  Comment: Due for diabetic screen since he is 43  Plan: GLUCOSE        Drawn and normal today.  Recheck every 3 years.    (R20.2) Paresthesia of right arm  Comment: I am  "not certain with this burning sensation in the arm is.  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  His exam today did not indicate any significant tightness or muscle spasm.  Plan: He has been just watch and wait and keep track of how often this is happening and if it is getting worse.  I told him to make particular note of any loss of strength and she is particularly having issues with holding onto equipment and we need to do a more aggressive evaluation.    (M67.442) Ganglion cyst of finger of left hand  Comment: Patient had an injury to his left long finger number of years ago where he was cut and repaired.  He has now noticed a small cyst developing over the palmar aspect of that finger near the PIP joint.  He has tolerated this but is getting to be more bothersome particular when he grasps a tool and there is pressure applied to it.  Plan: We will refer him to 1 of the orthopedic surgeons to have this looked at to see if that can be removed.     COUNSELING:  Reviewed preventive health counseling, as reflected in patient instructions       Regular exercise       Healthy diet/nutrition       Vision screening       Immunizations    Declined: Influenza due to Conscientious objector               Alcohol Use    BP Readings from Last 1 Encounters:   10/15/18 122/70     Estimated body mass index is 23.2 kg/(m^2) as calculated from the following:    Height as of this encounter: 5' 11\" (1.803 m).    Weight as of this encounter: 166 lb 6 oz (75.5 kg).           reports that he has been smoking Cigarettes.  He has been smoking about 1.00 pack per day. He has quit using smokeless tobacco.      Counseling Resources:  ATP IV Guidelines  Pooled Cohorts Equation Calculator  FRAX Risk Assessment  ICSI Preventive Guidelines  Dietary Guidelines for Americans, 2010  USDA's MyPlate  ASA " Prophylaxis  Lung CA Screening    Electronically signed by:  Beni Murrieta M.D.  10/15/2018

## 2018-10-16 PROBLEM — R20.2 PARESTHESIA OF RIGHT ARM: Status: ACTIVE | Noted: 2018-10-16

## 2018-10-16 PROBLEM — M67.442 GANGLION CYST OF FINGER OF LEFT HAND: Status: ACTIVE | Noted: 2018-10-16

## 2018-10-17 NOTE — PROGRESS NOTES
ORTHOPEDIC CLINIC CONSULT      Aleks Sumner is a 43 year old male who is seen in consultation at the request of Dr. Murrieta.  History of Present illness:  Aleks presents for evaluation of:   1.) Cyst on middle finger of left hand    Onset:  9 years    Symptoms brought on by Unknown.   Character:  swelling.    Progression of symptoms:  same.    Previous similar pain: no .   Pain Level:  0/10.   Previous treatments:  nothing.  Currently on Blood thinners? no  Diagnosis of Diabetes? no      Orthopedic Consult        Patient presents with:  Consult      The above note was reviewed and verified.    With our interview he does admit that he had a laceration to the radial side of his finger and ever since then he has had a sensory loss to the radial tip of the middle finger.  As stated this is been there for 9 years.  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  If he hits that he will get zingers.  He does not have any cold intolerance.    Prior history of related problems:  Not applicable    Patient's past medical, surgical, social and family histories reviewed.     Past Medical History:   Diagnosis Date     NO ACTIVE PROBLEMS        Past Surgical History:   Procedure Laterality Date     APPENDECTOMY  1983    ruptured appy       Medications:    No current outpatient prescriptions on file prior to visit.  No current facility-administered medications on file prior to visit.     No Known Allergies    Social History     Occupational History      [employer]     Social History Main Topics     Smoking status: Current Some Day Smoker     Packs/day: 0.20     Types: Cigarettes     Smokeless tobacco: Former User     Alcohol use 0.0 oz/week     0 Standard drinks or equivalent per week      Comment: socially, not heavy     Drug use: No     Sexual activity: Yes     Partners: Female       Family History   Problem Relation Age of Onset     Depression Mother      65     HEART DISEASE  "Maternal Grandfather      Other - See Comments Father      66     Other - See Comments Sister      3 yrs younger     Diabetes Paternal Uncle      Obesity Paternal Uncle      Other - See Comments Sister      10 yrs younger     Other - See Comments Daughter      2010     Other - See Comments Son      2011     Other - See Comments Son      2013     Alzheimer Disease Maternal Grandmother      Alzheimer Disease Paternal Grandmother      Other - See Comments Daughter      2001, from another marriage       REVIEW OF SYSTEMS    General: negative for fever or fatigue    Psych:  negative for anxiety or depression     Ophthalmic:  Corrective lenses?  No    ENT:  Hearing difficulty? No    CV: negative for chest pain, venous insuffiencey     Endocrine:  negative for diabetes     Urology:  negative for kidney disease    Resp:  Normal respiratory effort     Skin: negative for cuts/sores or redness    Musculoskeletal: as above    Neurologic:negative for numbness/tingling    Hematologic: negative for bleeding disorder, does not use of prescription anticoagulants         Physical Exam:    Vitals: /69 (BP Location: Left arm, Patient Position: Chair, Cuff Size: Adult Large)  Pulse 64  Ht 1.803 m (5' 11\")  Wt 75.5 kg (166 lb 6 oz)  BMI 23.2 kg/m2  BMI= Body mass index is 23.2 kg/(m^2).    GENERAL APPEARANCE:  Healthy, alert, no distress    SKIN:  negative for suspicious lesions or rashes    NEURO: Normal strength and tone, mentation intact and speech normal    PSYCH:   Mentation appears Normal and affect normal/bright    RESPIRATORY: negative for respiratory distress.    EYES: negative for Conjunctivitis    Cardiovascular: no Edema                radial Present                Toes warm and well perfused, brisk capillary refill.      GAIT: non-antalgic    JOINT/EXTREMITIES:    No visible deformity of his hand or wrist.  He has full range of motion of all digits and thumb.  Station along the radial aspect of the left middle " finger distal to the PIP joint is altered.  He has a palpable subcutaneous mass just distal to the PIP flexor crease.  It is along the radial side of the digit.  If this mass is tapped he will get the sensations into the fingertip.  There is a healed scar on the radial side of the digit.  This is mainly longitudinal along the radial side with a hockey stick extension carrying volarly towards the midline.  The perceived mass is in very close proximity to this extension.      Radiographs: X-rays were not taken.      Impression:     ICD-10-CM    1. Neuroma of hand left middle finger, radial side, level of PIP joint. 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            Plan:  The above was reviewed with Aleks VELASQUEZ explained to him how neuromas are formed.  It may not be complete given that he gets some sensation to the fingertip only tap on the nerve.  There is no reason to address this unless it begins to really bother him.  We discussed what that may represent in the form of cold intolerance or direct sensitivity.  However, with him having this for 9 years and living with it it does not appear to be at risk for either the above.    Therefore we mainly reassured him as to what is going on and he will address it if need be.    If he were to have more symptoms and require this to be addressed it would probably be wise to have a hand surgeon to dissect this out and do with a neuroma.  There may be fibers that are still intact and the decision might need to be made to whether to completely transect the nerve or separate out the neuroma and do the appropriate treatment to keep the neuroma from coming back.  This might require magnification to get accomplished.    He appears to fully understand this.    Return to clinic STEVEN Moreira MD

## 2018-10-19 ENCOUNTER — TELEPHONE (OUTPATIENT)
Dept: FAMILY MEDICINE | Facility: CLINIC | Age: 43
End: 2018-10-19

## 2018-10-19 NOTE — TELEPHONE ENCOUNTER
----- Message from Beni Murrieta MD sent at 10/19/2018  1:20 PM CDT -----  Please call patient with following results.  Let Aleks know that his labs are excellent.  His glucose was a little low at 55 but this is because he was fasting and does not have a lot of fat stores.  His cholesterol was excellent.  We will repeat glucose in 3 years and cholesterol in 5 years.    Electronically signed by:  Beni Murrieta M.D.  10/19/2018

## 2018-10-19 NOTE — TELEPHONE ENCOUNTER
Called patient and left message to return call in regards to results below. Please relay message to patient.   Brenda Perez MA

## 2018-10-22 ENCOUNTER — OFFICE VISIT (OUTPATIENT)
Dept: ORTHOPEDICS | Facility: CLINIC | Age: 43
End: 2018-10-22
Payer: COMMERCIAL

## 2018-10-22 VITALS
WEIGHT: 166.38 LBS | HEIGHT: 71 IN | HEART RATE: 64 BPM | SYSTOLIC BLOOD PRESSURE: 119 MMHG | BODY MASS INDEX: 23.29 KG/M2 | DIASTOLIC BLOOD PRESSURE: 69 MMHG

## 2018-10-22 DIAGNOSIS — D36.12 NEUROMA OF HAND: Primary | ICD-10-CM

## 2018-10-22 PROBLEM — R20.2 PARESTHESIA OF RIGHT ARM: Status: RESOLVED | Noted: 2018-10-16 | Resolved: 2018-10-22

## 2018-10-22 PROBLEM — M67.442 GANGLION CYST OF FINGER OF LEFT HAND: Status: RESOLVED | Noted: 2018-10-16 | Resolved: 2018-10-22

## 2018-10-22 PROCEDURE — 99243 OFF/OP CNSLTJ NEW/EST LOW 30: CPT | Performed by: ORTHOPAEDIC SURGERY

## 2018-10-22 ASSESSMENT — PAIN SCALES - GENERAL: PAINLEVEL: NO PAIN (0)

## 2018-10-22 NOTE — MR AVS SNAPSHOT
"              After Visit Summary   10/22/2018    Aleks Sumner    MRN: [id number]           Patient Information     Date Of Birth          1975        Visit Information        Provider Department      10/22/2018 2:40 PM Denny Moreira MD [facility]        Today's Diagnoses     Neuroma of hand left middle finger, radial side, level of PIP joint.    -  1       Follow-ups after your visit        Who to contact     If you have questions or need follow up information about today's clinic visit or your schedule please contact [facility] directly at [phone].  Normal or non-critical lab and imaging results will be communicated to you by MyChart, letter or phone within 4 business days after the clinic has received the results. If you do not hear from us within 7 days, please contact the clinic through MyChart or phone. If you have a critical or abnormal lab result, we will notify you by phone as soon as possible.  Submit refill requests through [facility] or call your pharmacy and they will forward the refill request to us. Please allow 3 business days for your refill to be completed.          Additional Information About Your Visit        Care EveryWhere ID     This is your Care EveryWhere ID. This could be used by other organizations to access your [place] medical records  [id number]        Your Vitals Were     Pulse Height BMI (Body Mass Index)             64 1.803 m (5' 11\") 23.2 kg/m2          Blood Pressure from Last 3 Encounters:   10/22/18 119/69   10/15/18 122/70   05/29/17 124/71    Weight from Last 3 Encounters:   10/22/18 75.5 kg (166 lb 6 oz)   10/15/18 75.5 kg (166 lb 6 oz)   05/29/17 79.4 kg (175 lb)              Today, you had the following     No orders found for display       Primary Care Provider Office Phone # Fax #    Beni Murrieta [phone]       [address] [place] [address]        Equal Access to Services  "    OSBALDO RAMOS : Hadii aad dariana babs Castro, wabiggda luqadaha, qaybta kaalmada aakashshakeelhansa, dior licearustynaman abrams. So [facility] [phone].    ATENCIÓN: Si habla español, tiene a blanc disposición servicios gratuitos de asistencia lingüística. Llame al [phone].    We comply with applicable federal civil rights laws and [place] laws. We do not discriminate on the basis of race, color, national origin, age, disability, sex, sexual orientation, or gender identity.            Thank you!     Thank you for choosing [facility]  for your care. Our goal is always to provide you with excellent care. Hearing back from our patients is one way we can continue to improve our services. Please take a few minutes to complete the written survey that you may receive in the mail after your visit with us. Thank you!             Your Updated Medication List - Protect others around you: Learn how to safely use, store and throw away your medicines at www.disposemymeds.org.      Notice  As of 10/22/2018  3:25 PM    You have not been prescribed any medications.

## 2018-10-22 NOTE — TELEPHONE ENCOUNTER
The patient called back and information has been given.   Thank you,  Rosina Giordano   for [facility]

## 2018-10-22 NOTE — LETTER
10/22/2018         RE: Aleks Sumner  [address]  [facility] [address]        Dear Colleague,    Thank you for referring your patient, Aleks Sumner, to the [facility]. Please see a copy of my visit note below.    ORTHOPEDIC CLINIC CONSULT      Aleks Sumner is a 43 year old male who is seen in consultation at the request of Dr. Murrieta.  History of Present illness:  Aleks presents for evaluation of:   1.) Cyst on middle finger of left hand    Onset:  9 years    Symptoms brought on by Unknown.   Character:  swelling.    Progression of symptoms:  same.    Previous similar pain: no .   Pain Level:  0/10.   Previous treatments:  nothing.  Currently on Blood thinners? no  Diagnosis of Diabetes? no      Orthopedic Consult        Patient presents with:  Consult      The above note was reviewed and verified.    With our interview he does admit that he had a laceration to the radial side of his finger and ever since then he has had a sensory loss to the radial tip of the middle finger.  As stated this is been there for 9 years.  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  If he hits that he will get zingers.  He does not have any cold intolerance.    Prior history of related problems:  Not applicable    Patient's past medical, surgical, social and family histories reviewed.     Past Medical History:   Diagnosis Date     NO ACTIVE PROBLEMS        Past Surgical History:   Procedure Laterality Date     APPENDECTOMY  1983    ruptured appy       Medications:    No current outpatient prescriptions on file prior to visit.  No current facility-administered medications on file prior to visit.     No Known Allergies    Social History     Occupational History      [employer]     Social History Main Topics     Smoking status: Current Some Day Smoker     Packs/day: 0.20     Types: Cigarettes     Smokeless tobacco: Former User      "Alcohol use 0.0 oz/week     0 Standard drinks or equivalent per week      Comment: socially, not heavy     Drug use: No     Sexual activity: Yes     Partners: Female       Family History   Problem Relation Age of Onset     Depression Mother      65     HEART DISEASE Maternal Grandfather      Other - See Comments Father      66     Other - See Comments Sister      3 yrs younger     Diabetes Paternal Uncle      Obesity Paternal Uncle      Other - See Comments Sister      10 yrs younger     Other - See Comments Daughter      2010     Other - See Comments Son      2011     Other - See Comments Son      2013     Alzheimer Disease Maternal Grandmother      Alzheimer Disease Paternal Grandmother      Other - See Comments Daughter      2001, from another marriage       REVIEW OF SYSTEMS    General: negative for fever or fatigue    Psych:  negative for anxiety or depression     Ophthalmic:  Corrective lenses?  No    ENT:  Hearing difficulty? No    CV: negative for chest pain, venous insuffiencey     Endocrine:  negative for diabetes     Urology:  negative for kidney disease    Resp:  Normal respiratory effort     Skin: negative for cuts/sores or redness    Musculoskeletal: as above    Neurologic:negative for numbness/tingling    Hematologic: negative for bleeding disorder, does not use of prescription anticoagulants         Physical Exam:    Vitals: /69 (BP Location: Left arm, Patient Position: Chair, Cuff Size: Adult Large)  Pulse 64  Ht 1.803 m (5' 11\")  Wt 75.5 kg (166 lb 6 oz)  BMI 23.2 kg/m2  BMI= Body mass index is 23.2 kg/(m^2).    GENERAL APPEARANCE:  Healthy, alert, no distress    SKIN:  negative for suspicious lesions or rashes    NEURO: Normal strength and tone, mentation intact and speech normal    PSYCH:   Mentation appears Normal and affect normal/bright    RESPIRATORY: negative for respiratory distress.    EYES: negative for Conjunctivitis    Cardiovascular: no Edema                radial Present     "            Toes warm and well perfused, brisk capillary refill.      GAIT: non-antalgic    JOINT/EXTREMITIES:    No visible deformity of his hand or wrist.  He has full range of motion of all digits and thumb.  Station along the radial aspect of the left middle finger distal to the PIP joint is altered.  He has a palpable subcutaneous mass just distal to the PIP flexor crease.  It is along the radial side of the digit.  If this mass is tapped he will get the sensations into the fingertip.  There is a healed scar on the radial side of the digit.  This is mainly longitudinal along the radial side with a hockey stick extension carrying volarly towards the midline.  The perceived mass is in very close proximity to this extension.      Radiographs: X-rays were not taken.      Impression:     ICD-10-CM    1. Neuroma of hand left middle finger, radial side, level of PIP joint. 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            Plan:  The above was reviewed with Aleks VELASQUEZ explained to him how neuromas are formed.  It may not be complete given that he gets some sensation to the fingertip only tap on the nerve.  There is no reason to address this unless it begins to really bother him.  We discussed what that may represent in the form of cold intolerance or direct sensitivity.  However, with him having this for 9 years and living with it it does not appear to be at risk for either the above.    Therefore we mainly reassured him as to what is going on and he will address it if need be.    If he were to have more symptoms and require this to be addressed it would probably be wise to have a hand surgeon to dissect this out and do with a neuroma.  There may be fibers that are still intact and the decision might need to be made to whether to completely transect the nerve or separate out the neuroma and do the appropriate treatment to keep the  neuroma from coming back.  This might require magnification to get accomplished.    He appears to fully understand this.    Return to clinic PRN    Denny Moreira MD                    Again, thank you for allowing me to participate in the care of your patient.        Sincerely,        Denny Moreira MD

## 2020-01-13 ENCOUNTER — TRANSFERRED RECORDS (OUTPATIENT)
Dept: HEALTH INFORMATION MANAGEMENT | Facility: CLINIC | Age: 45
End: 2020-01-13

## 2020-01-28 ENCOUNTER — TRANSFERRED RECORDS (OUTPATIENT)
Dept: HEALTH INFORMATION MANAGEMENT | Facility: CLINIC | Age: 45
End: 2020-01-28

## 2020-01-29 ENCOUNTER — OFFICE VISIT (OUTPATIENT)
Dept: ORTHOPEDICS | Facility: CLINIC | Age: 45
End: 2020-01-29
Payer: OTHER MISCELLANEOUS

## 2020-01-29 ENCOUNTER — TELEPHONE (OUTPATIENT)
Dept: ORTHOPEDICS | Facility: CLINIC | Age: 45
End: 2020-01-29

## 2020-01-29 VITALS
SYSTOLIC BLOOD PRESSURE: 100 MMHG | DIASTOLIC BLOOD PRESSURE: 60 MMHG | HEIGHT: 72 IN | BODY MASS INDEX: 23.16 KG/M2 | WEIGHT: 171 LBS

## 2020-01-29 DIAGNOSIS — S83.282A TEAR OF LATERAL MENISCUS OF LEFT KNEE, CURRENT, UNSPECIFIED TEAR TYPE, INITIAL ENCOUNTER: Primary | ICD-10-CM

## 2020-01-29 DIAGNOSIS — M71.22 SYNOVIAL CYST OF LEFT POPLITEAL SPACE: ICD-10-CM

## 2020-01-29 DIAGNOSIS — M25.562 ACUTE PAIN OF LEFT KNEE: ICD-10-CM

## 2020-01-29 PROCEDURE — 99214 OFFICE O/P EST MOD 30 MIN: CPT | Performed by: ORTHOPAEDIC SURGERY

## 2020-01-29 ASSESSMENT — PAIN SCALES - GENERAL: PAINLEVEL: NO PAIN (0)

## 2020-01-29 ASSESSMENT — MIFFLIN-ST. JEOR: SCORE: 1693.65

## 2020-01-29 NOTE — TELEPHONE ENCOUNTER
MRI order placed per Marisa Barahona and signed. 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

## 2020-01-29 NOTE — TELEPHONE ENCOUNTER
Reason for Call:  Other call back    Detailed comments: Please place the MRI orders and then call him so he can schedule     Phone Number Patient can be reached at: Home number on file [phone] (home)    Best Time: NA    Can we leave a detailed message on this number? YES    Call taken on 1/29/2020 at 1:43 PM by Margie Espitia

## 2020-01-29 NOTE — LETTER
1/29/2020         RE: Aleks Sumner  [address]  [place] [address]        Dear Colleague,    Thank you for referring your patient, Aleks Sumner, to the [facility]. Please see a copy of my visit note below.    ORTHOPEDIC CLINIC CONSULT    HPI:  Patient reports injuring his left knee 2 weeks ago at a job site in [place] ([place]).  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  He states as he was dragging this piece of metal, he felt a pop along the lateral side of his knee.  He did return to work with restriction to avoid slippery surfaces.     Referred by: urgent care provider in [place]    Prior history of related problems:  No    Patient's past medical, surgical, social and family histories reviewed.       Past Medical History:   Diagnosis Date     NO ACTIVE PROBLEMS        Past Surgical History:   Procedure Laterality Date     APPENDECTOMY  1983    ruptured appy       Home Medications:  Prior to Admission medications    Medication Sig Start Date End Date Taking? Authorizing Provider   IBUPROFEN PO Take 200 mg by mouth   Yes Reported, Patient       No Known Allergies    Social History     Occupational History     Occupation:      Employer: [employer]   Tobacco Use     Smoking status: Current Some Day Smoker     Packs/day: 0.20     Types: Cigarettes     Smokeless tobacco: Former User   Substance and Sexual Activity     Alcohol use: Yes     Alcohol/week: 0.0 standard drinks     Comment: socially, not heavy     Drug use: No     Sexual activity: Yes     Partners: Female       Family History   Problem Relation Age of Onset     Depression Mother         65     Heart Disease Maternal Grandfather      Other - See Comments Father         66     Other - See Comments Sister         3 yrs younger     Diabetes Paternal Uncle      Obesity Paternal Uncle      Other - See Comments Sister         10 yrs  younger     Other - See Comments Daughter         2010     Other - See Comments Son         2011     Other - See Comments Son         2013     Alzheimer Disease Maternal Grandmother      Alzheimer Disease Paternal Grandmother      Other - See Comments Daughter         2001, from another marriage       REVIEW OF SYSTEMS    General: Negative for fever or fatigue    Psych:  negative anxiety or depression     Ophthalmic:  Corrective lenses?  No    ENT:  Hearing difficulty? No    CV: negative for chest pain, venous insufficiency, no history of MI or stroke    Endocrine:  negative diabetes     Urology:  negative kidney disease    Resp:  Normal respiratory effort, no history of pulmonary disease or asthma    Skin: negative for cuts/sores or redness    Musculoskeletal: as above    Neurologic:negative for numbness/tingling    Hematologic: negative for bleeding disorder, does not use of prescription anticoagulants         Physical Exam:    Vitals: /60   Ht 1.829 m (6')   Wt 77.6 kg (171 lb)   BMI 23.19 kg/m     BMI= Body mass index is 23.19 kg/m .    GENERAL APPEARANCE: Healthy, alert, no distress    SKIN:  negative for suspicious lesions or rashes    NEURO: Normal strength and tone, mentation intact and speech normal    PSYCH:   Mentation appears Normal and affect normal/bright    RESPIRATORY: negative for respiratory distress.    EYES: negative for Conjunctivitis    Cardiovascular: No vascular discoloration      GAIT: mildly antalgic    JOINT/EXTREMITIES:  Left knee does have some moderate swelling present.  Posterior knee is visible swelling to suggest Baker's cyst.  Palpation of the knee swelling is well palpated throughout.  Patient without significant pain on palpation.  Patient maintains good range of motion with full extension and flexion 120 degrees or better  Teresa's testing is positive for a palpable snap along the lateral knee.  This is very consistent with repeat maneuvers.  There is no snapping when  testing of the right knee for comparison.  Collateral ligaments are intact without laxity, no posterior anterior excursion.  Gait is mildly antalgic    Radiographs:. X-ray images reveal good spacing between the medial and lateral compartments.  Of the patella within the femoral groove.  Patient does have a more flattened femoral groove.  Patellofemoral compartment with good spacing and good alignment  Independent visualization of the films was made.     Impression:      ICD-10-CM    1. Tear of lateral meniscus of left knee, current, unspecified tear type, initial encounter S83.282A XR Knee Left G/E 4 Views     CANCELED: XR Knee Left G/E 4 Views   2. Synovial cyst of left popliteal space M71.22      Patient with visible knee effusion and palpable Baker's cyst.  Patient also with a palpable snap along the lateral aspect of his left knee that is not present on the right knee.  This does not cause patient much discomfort at the present time.    Plan:  We will obtain MRI for further evaluation of the lateral compartment.  We have adjusted patient's work restrictions to avoid any bending, lifting, stooping, climbing and avoidance of slippery surfaces.  Patient will follow-up shortly after MRI is accomplished to establish plan and repeat examination  Patient is Workmen's Comp.    BP Readings from Last 1 Encounters:   01/29/20 100/60     BP noted to be well controlled today in office.     Scribed by  Marisa Adam PA-C   1/29/2020  1:22 PM      The information in this document, created by a scribe for me, accurately reflects the services I personally performed and the decisions made by me. I have reviewed and approved this document for accuracy    Burak Chaparro MD    Again, thank you for allowing me to participate in the care of your patient.        Sincerely,        Burak Chaparro MD

## 2020-01-29 NOTE — LETTER
[address] [address]  [phone]          January 29, 2020    RE:  Aleks Sumner                                                                                                                                                       [address]  [place] [address]            To whom it may concern:    Aleks Sumner is under my professional care for left knee injury.  Please excuse patient from working under slippery surfaces.  Also please avoid bending, squatting, lifting >20 pounds at this time.      Sincerely,        Burak Chaparro MD

## 2020-01-29 NOTE — PROGRESS NOTES
ORTHOPEDIC CLINIC CONSULT    HPI:  Patient reports injuring his left knee 2 weeks ago at a job site in [place] ([place] School).  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  He states as he was dragging this piece of metal, he felt a pop along the lateral side of his knee.  He did return to work with restriction to avoid slippery surfaces.     Referred by: urgent care provider in [place]    Prior history of related problems:  No    Patient's past medical, surgical, social and family histories reviewed.       Past Medical History:   Diagnosis Date     NO ACTIVE PROBLEMS        Past Surgical History:   Procedure Laterality Date     APPENDECTOMY  1983    ruptured appy       Home Medications:  Prior to Admission medications    Medication Sig Start Date End Date Taking? Authorizing Provider   IBUPROFEN PO Take 200 mg by mouth   Yes Reported, Patient       No Known Allergies    Social History     Occupational History     Occupation:      Employer: [employer]   Tobacco Use     Smoking status: Current Some Day Smoker     Packs/day: 0.20     Types: Cigarettes     Smokeless tobacco: Former User   Substance and Sexual Activity     Alcohol use: Yes     Alcohol/week: 0.0 standard drinks     Comment: socially, not heavy     Drug use: No     Sexual activity: Yes     Partners: Female       Family History   Problem Relation Age of Onset     Depression Mother         65     Heart Disease Maternal Grandfather      Other - See Comments Father         66     Other - See Comments Sister         3 yrs younger     Diabetes Paternal Uncle      Obesity Paternal Uncle      Other - See Comments Sister         10 yrs younger     Other - See Comments Daughter         2010     Other - See Comments Son         2011     Other - See Comments Son         2013     Alzheimer Disease Maternal Grandmother      Alzheimer Disease Paternal Grandmother      Other - See Comments Daughter          2001, from another marriage       REVIEW OF SYSTEMS    General: Negative for fever or fatigue    Psych:  negative anxiety or depression     Ophthalmic:  Corrective lenses?  No    ENT:  Hearing difficulty? No    CV: negative for chest pain, venous insufficiency, no history of MI or stroke    Endocrine:  negative diabetes     Urology:  negative kidney disease    Resp:  Normal respiratory effort, no history of pulmonary disease or asthma    Skin: negative for cuts/sores or redness    Musculoskeletal: as above    Neurologic:negative for numbness/tingling    Hematologic: negative for bleeding disorder, does not use of prescription anticoagulants         Physical Exam:    Vitals: /60   Ht 1.829 m (6')   Wt 77.6 kg (171 lb)   BMI 23.19 kg/m    BMI= Body mass index is 23.19 kg/m .    GENERAL APPEARANCE: Healthy, alert, no distress    SKIN:  negative for suspicious lesions or rashes    NEURO: Normal strength and tone, mentation intact and speech normal    PSYCH:   Mentation appears Normal and affect normal/bright    RESPIRATORY: negative for respiratory distress.    EYES: negative for Conjunctivitis    Cardiovascular: No vascular discoloration      GAIT: mildly antalgic    JOINT/EXTREMITIES:  Left knee does have some moderate swelling present.  Posterior knee is visible swelling to suggest Baker's cyst.  Palpation of the knee swelling is well palpated throughout.  Patient without significant pain on palpation.  Patient maintains good range of motion with full extension and flexion 120 degrees or better  Teresa's testing is positive for a palpable snap along the lateral knee.  This is very consistent with repeat maneuvers.  There is no snapping when testing of the right knee for comparison.  Collateral ligaments are intact without laxity, no posterior anterior excursion.  Gait is mildly antalgic    Radiographs:. X-ray images reveal good spacing between the medial and lateral compartments.  Of the patella  within the femoral groove.  Patient does have a more flattened femoral groove.  Patellofemoral compartment with good spacing and good alignment  Independent visualization of the films was made.     Impression:      ICD-10-CM    1. Tear of lateral meniscus of left knee, current, unspecified tear type, initial encounter S83.282A XR Knee Left G/E 4 Views     CANCELED: XR Knee Left G/E 4 Views   2. Synovial cyst of left popliteal space M71.22      Patient with visible knee effusion and palpable Baker's cyst.  Patient also with a palpable snap along the lateral aspect of his left knee that is not present on the right knee.  This does not cause patient much discomfort at the present time.    Plan:  We will obtain MRI for further evaluation of the lateral compartment.  We have adjusted patient's work restrictions to avoid any bending, lifting, stooping, climbing and avoidance of slippery surfaces.  Patient will follow-up shortly after MRI is accomplished to establish plan and repeat examination  Patient is Workmen's Comp.    BP Readings from Last 1 Encounters:   01/29/20 100/60     BP noted to be well controlled today in office.     Scribed by  Marisa Adam PA-C   1/29/2020  1:22 PM      The information in this document, created by a scribe for me, accurately reflects the services I personally performed and the decisions made by me. I have reviewed and approved this document for accuracy    Burak Chaparro MD

## 2020-02-06 ENCOUNTER — HOSPITAL ENCOUNTER (OUTPATIENT)
Dept: MRI IMAGING | Facility: CLINIC | Age: 45
Discharge: HOME | End: 2020-02-06
Attending: PHYSICIAN ASSISTANT | Admitting: PHYSICIAN ASSISTANT
Payer: OTHER MISCELLANEOUS

## 2020-02-06 DIAGNOSIS — M71.22 SYNOVIAL CYST OF LEFT POPLITEAL SPACE: ICD-10-CM

## 2020-02-06 DIAGNOSIS — S83.282A TEAR OF LATERAL MENISCUS OF LEFT KNEE, CURRENT, UNSPECIFIED TEAR TYPE, INITIAL ENCOUNTER: ICD-10-CM

## 2020-02-06 DIAGNOSIS — M25.562 ACUTE PAIN OF LEFT KNEE: ICD-10-CM

## 2020-02-06 PROCEDURE — 73721 MRI JNT OF LWR EXTRE W/O DYE: CPT | Mod: LT

## 2020-02-11 ENCOUNTER — TELEPHONE (OUTPATIENT)
Dept: ORTHOPEDICS | Facility: CLINIC | Age: 45
End: 2020-02-11

## 2020-02-11 NOTE — TELEPHONE ENCOUNTER
Reason for Call:  Other call back    Detailed comments: Please call Pt and let him know if he can get the results over phone    Phone Number Patient can be reached at: Home number on file [phone] (home)    Best Time: NA    Can we leave a detailed message on this number? YES    Call taken on 2/11/2020 at 12:06 PM by Margie Espitia

## 2020-02-25 ENCOUNTER — OFFICE VISIT (OUTPATIENT)
Dept: ORTHOPEDICS | Facility: CLINIC | Age: 45
End: 2020-02-25
Payer: OTHER MISCELLANEOUS

## 2020-02-25 ENCOUNTER — TELEPHONE (OUTPATIENT)
Dept: FAMILY MEDICINE | Facility: CLINIC | Age: 45
End: 2020-02-25

## 2020-02-25 VITALS
SYSTOLIC BLOOD PRESSURE: 110 MMHG | WEIGHT: 171 LBS | DIASTOLIC BLOOD PRESSURE: 70 MMHG | BODY MASS INDEX: 23.16 KG/M2 | HEIGHT: 72 IN

## 2020-02-25 DIAGNOSIS — M71.22 SYNOVIAL CYST OF LEFT POPLITEAL SPACE: ICD-10-CM

## 2020-02-25 DIAGNOSIS — S83.242D OTHER TEAR OF MEDIAL MENISCUS OF LEFT KNEE AS CURRENT INJURY, SUBSEQUENT ENCOUNTER: Primary | ICD-10-CM

## 2020-02-25 DIAGNOSIS — S89.92XD INJURY OF LEFT KNEE, SUBSEQUENT ENCOUNTER: ICD-10-CM

## 2020-02-25 DIAGNOSIS — S83.512D RUPTURE OF ANTERIOR CRUCIATE LIGAMENT OF LEFT KNEE, SUBSEQUENT ENCOUNTER: ICD-10-CM

## 2020-02-25 PROCEDURE — 99213 OFFICE O/P EST LOW 20 MIN: CPT | Performed by: ORTHOPAEDIC SURGERY

## 2020-02-25 ASSESSMENT — MIFFLIN-ST. JEOR: SCORE: 1693.65

## 2020-02-25 NOTE — LETTER
[address] [address]  [phone]        02/25/20      RE:  Aleks Sumner                                                                                                                                                       [address]  [facility] [address]            To whom it may concern:    Aleks Sumner is under my professional care for left knee injury.  Please continue patient restrictions of no working under slippery surfaces.  Also please avoid bending, climbing, squatting, lifting >20 pounds at this time.      Sincerely,        Burak Chaparro MD

## 2020-02-25 NOTE — TELEPHONE ENCOUNTER
Type of surgery: left knee arthroscopy with medial meniscectomy-evaluate ACL  Location of surgery: [facility]   Date of surgery: 3/6/20  Surgeon: Dr. Chaparro  Pre-Op Appt Date: message sent to PCP  Post-Op Appt Date: 3/12/20   Packet sent out: Surgery packet was given to patient in clinic.   Pre-cert/Authorization completed: NA  Date: 2/25/2020    Marlys Fitzgerald  Surgery Scheduler

## 2020-02-25 NOTE — LETTER
2/25/2020         RE: Aleks Sumner  [address]  [facility] [address]        Dear Colleague,    Thank you for referring your patient, Aleks Sumner, to the [facility]. Please see a copy of my visit note below.    HISTORY OF PRESENT ILLNESS:    Aleks Sumner is a 44 year old male who is seen in follow up for   Chief Complaint   Patient presents with     Results     left knee MRI results   Present symptoms: Patient states he says some days are better than others.  He does have some days that are not as good.  He describes a clunking feeling especially when he straightens his leg fully.  He also has continued pain in the back of the knee.  Patient reports competence is not always there in his knee.  He has never had knee issues prior to this injury.  Patient did have interval MRI and is here for results.  This is a Workmen's Comp. injury  Treatments tried to this point: Patient has been placed on work restrictions to minimize load and further injury with slippery surfaces.  He has been using over-the-counter pain medications.  Patient states he continues to ice and elevate as needed for swelling.  He does admit at the end of the workday his knee is fatigued.    Physical Exam:  Vitals: /70   Ht 1.829 m (6')   Wt 77.6 kg (171 lb)   BMI 23.19 kg/m     BMI= Body mass index is 23.19 kg/m .  Constitutional: healthy, alert and no acute distress   Psychiatric: mentation appears normal and affect normal/bright  NEURO: no focal deficits  SKIN: no excoriation or erythema. No signs of infection.  JOINT/EXTREMITIES:  Affected extremity pulses are easily palpable.  Knee Exam: Inspection: There is little to no effusion today.  Tender: Patient does have tenderness at the posterior medial joint line.  Active Range of Motion: Patient maintains full extension and flexion  Strength: Patient has symmetrical quad tone  Special tests: positive Teresa's, there is a very  "subtle laxity to the left knee with anterior excursion with comparison of the right knee.  There is no significant varus or valgus laxity  GAIT: non-antalgic    IMAGING INTERPRETATION:  MRI images are available.  Per radiology report, patient with a partial ACL tear.  There is a wavy appearance to the ACL.  Patient also with medial meniscus horizontal tear.  This is readily seen.  There is also a very sizable Baker's cyst present.  There is chondromalacia present the medial condyle  Independent visualization of the images was performed.     ASSESSMENT:    Chief Complaint   Patient presents with     Results     left knee MRI results       ICD-10-CM    1. Other tear of medial meniscus of left knee as current injury, subsequent encounter S83.242D Case Request: ARTHROSCOPY, KNEE, WITH MEDIAL MENISCECTOMY,  Evaluate ACL     Case Request: ARTHROSCOPY, KNEE, WITH MEDIAL MENISCECTOMY,  Evaluate ACL   2. Synovial cyst of left popliteal space M71.22    3. Injury of left knee, subsequent encounter S89.92XD    4. Rupture of anterior cruciate ligament of left knee, subsequent encounter S83.512D Case Request: ARTHROSCOPY, KNEE, WITH MEDIAL MENISCECTOMY,  Evaluate ACL     Case Request: ARTHROSCOPY, KNEE, WITH MEDIAL MENISCECTOMY,  Evaluate ACL     Patient with symptoms of medial meniscus tear.  Patient does mention some instability or \"clunking this of his knee.  MRI does reveal a partial ACL tear however on examination, patient appears to have good stability of his knee.    Plan:   Weight bearing status: Weight bearing as tolerated  Patient can continue to utilize over-the-counter pain medications for comfort.    Current work restrictions of avoiding slippery surfaces, climbing, lifting greater than 20 pounds should be continued.  Discussion today was very thorough in regards to addressing current issues with surgery.  After detailed discussion, it is determined that patient would benefit most from medial meniscus debridement and " possible decompression of Baker's cyst.  We would evaluate the ACL as our clinical assessment appears to be mostly intact and the recovery time from ACL would be much longer.  Recovery for the meniscus debridement would be approximately 6 weeks postoperatively.  Patient is struggling with the Workmen's Comp. issue.  We have advised him that we would not close the case postoperatively for approximately 6 months in the event he encounters issues from the ACL.  Patient would like to proceed with left knee arthroscopy with medial meniscus debridement and possible decompression of the Baker's cyst.    Patient will continue conservative treatment of ice and elevation until surgery.    Return to clinic 10 to 14 days postop    BP Readings from Last 1 Encounters:   02/25/20 110/70     BP noted to be well controlled today in office.     Scribed by  Marisa Adam PA-C   2/26/2020  1:00 PM    The information in this document, created by a scribe for me, accurately reflects the services I personally performed and the decisions made by me. I have reviewed and approved this document for accuracy    Burak Chaparro MD    Again, thank you for allowing me to participate in the care of your patient.        Sincerely,        Burak Chaparro MD

## 2020-02-25 NOTE — TELEPHONE ENCOUNTER
Reached out to patient, relayed message, appt scheduled    Cee Gordon ~ Patient Representative  [address] [address]  [email or web address]  [email or web address]  Office:  [phone]  Fax:  [phone]

## 2020-02-25 NOTE — LETTER
[address] [address]  [phone]        02/25/20      RE:  Aleks Sumner                                                                                                                                                       [address]  [facility] [address]            To whom it may concern:    Aleks Sumner is under my professional care for left knee injury.  Please excuse patient from working under slippery surfaces.  Also please avoid bending, squatting, lifting >20 pounds at this time.      Sincerely,        Burak Chaparro MD

## 2020-02-26 NOTE — PROGRESS NOTES
HISTORY OF PRESENT ILLNESS:    Aleks Sumner is a 44 year old male who is seen in follow up for   Chief Complaint   Patient presents with     Results     left knee MRI results   Present symptoms: Patient states he says some days are better than others.  He does have some days that are not as good.  He describes a clunking feeling especially when he straightens his leg fully.  He also has continued pain in the back of the knee.  Patient reports competence is not always there in his knee.  He has never had knee issues prior to this injury.  Patient did have interval MRI and is here for results.  This is a Workmen's Comp. injury  Treatments tried to this point: Patient has been placed on work restrictions to minimize load and further injury with slippery surfaces.  He has been using over-the-counter pain medications.  Patient states he continues to ice and elevate as needed for swelling.  He does admit at the end of the workday his knee is fatigued.    Physical Exam:  Vitals: /70   Ht 1.829 m (6')   Wt 77.6 kg (171 lb)   BMI 23.19 kg/m    BMI= Body mass index is 23.19 kg/m .  Constitutional: healthy, alert and no acute distress   Psychiatric: mentation appears normal and affect normal/bright  NEURO: no focal deficits  SKIN: no excoriation or erythema. No signs of infection.  JOINT/EXTREMITIES:  Affected extremity pulses are easily palpable.  Knee Exam: Inspection: There is little to no effusion today.  Tender: Patient does have tenderness at the posterior medial joint line.  Active Range of Motion: Patient maintains full extension and flexion  Strength: Patient has symmetrical quad tone  Special tests: positive Teresa's, there is a very subtle laxity to the left knee with anterior excursion with comparison of the right knee.  There is no significant varus or valgus laxity  GAIT: non-antalgic    IMAGING INTERPRETATION:  MRI images are available.  Per radiology report, patient with a partial ACL  "tear.  There is a wavy appearance to the ACL.  Patient also with medial meniscus horizontal tear.  This is readily seen.  There is also a very sizable Baker's cyst present.  There is chondromalacia present the medial condyle  Independent visualization of the images was performed.     ASSESSMENT:    Chief Complaint   Patient presents with     Results     left knee MRI results       ICD-10-CM    1. Other tear of medial meniscus of left knee as current injury, subsequent encounter S83.242D Case Request: ARTHROSCOPY, KNEE, WITH MEDIAL MENISCECTOMY,  Evaluate ACL     Case Request: ARTHROSCOPY, KNEE, WITH MEDIAL MENISCECTOMY,  Evaluate ACL   2. Synovial cyst of left popliteal space M71.22    3. Injury of left knee, subsequent encounter S89.92XD    4. Rupture of anterior cruciate ligament of left knee, subsequent encounter S83.512D Case Request: ARTHROSCOPY, KNEE, WITH MEDIAL MENISCECTOMY,  Evaluate ACL     Case Request: ARTHROSCOPY, KNEE, WITH MEDIAL MENISCECTOMY,  Evaluate ACL     Patient with symptoms of medial meniscus tear.  Patient does mention some instability or \"clunking this of his knee.  MRI does reveal a partial ACL tear however on examination, patient appears to have good stability of his knee.    Plan:   Weight bearing status: Weight bearing as tolerated  Patient can continue to utilize over-the-counter pain medications for comfort.    Current work restrictions of avoiding slippery surfaces, climbing, lifting greater than 20 pounds should be continued.  Discussion today was very thorough in regards to addressing current issues with surgery.  After detailed discussion, it is determined that patient would benefit most from medial meniscus debridement and possible decompression of Baker's cyst.  We would evaluate the ACL as our clinical assessment appears to be mostly intact and the recovery time from ACL would be much longer.  Recovery for the meniscus debridement would be approximately 6 weeks postoperatively. "  Patient is struggling with the Workmen's Comp. issue.  We have advised him that we would not close the case postoperatively for approximately 6 months in the event he encounters issues from the ACL.  Patient would like to proceed with left knee arthroscopy with medial meniscus debridement and possible decompression of the Baker's cyst.    Patient will continue conservative treatment of ice and elevation until surgery.    Return to clinic 10 to 14 days postop    BP Readings from Last 1 Encounters:   02/25/20 110/70     BP noted to be well controlled today in office.     Scribed by  Marisa Adam PA-C   2/26/2020  1:00 PM    The information in this document, created by a scribe for me, accurately reflects the services I personally performed and the decisions made by me. I have reviewed and approved this document for accuracy    Burak Chaparro MD

## 2020-03-05 ENCOUNTER — OFFICE VISIT (OUTPATIENT)
Dept: FAMILY MEDICINE | Facility: CLINIC | Age: 45
End: 2020-03-05
Payer: OTHER MISCELLANEOUS

## 2020-03-05 ENCOUNTER — ANESTHESIA EVENT (OUTPATIENT)
Dept: SURGERY | Facility: CLINIC | Age: 45
End: 2020-03-05
Payer: OTHER MISCELLANEOUS

## 2020-03-05 VITALS
TEMPERATURE: 98.7 F | DIASTOLIC BLOOD PRESSURE: 76 MMHG | SYSTOLIC BLOOD PRESSURE: 110 MMHG | WEIGHT: 173 LBS | RESPIRATION RATE: 18 BRPM | BODY MASS INDEX: 23.46 KG/M2 | HEART RATE: 80 BPM | OXYGEN SATURATION: 96 %

## 2020-03-05 DIAGNOSIS — S83.242D OTHER TEAR OF MEDIAL MENISCUS OF LEFT KNEE AS CURRENT INJURY, SUBSEQUENT ENCOUNTER: ICD-10-CM

## 2020-03-05 DIAGNOSIS — Z01.818 PREOP GENERAL PHYSICAL EXAM: Primary | ICD-10-CM

## 2020-03-05 PROCEDURE — 99214 OFFICE O/P EST MOD 30 MIN: CPT | Performed by: FAMILY MEDICINE

## 2020-03-05 ASSESSMENT — PAIN SCALES - GENERAL: PAINLEVEL: NO PAIN (0)

## 2020-03-05 NOTE — PROGRESS NOTES
[address] [address]  [phone]  Dept: [phone]    PRE-OP EVALUATION:  Today's date: 3/5/2020    Aleks Sumner (: 1975) presents for pre-operative evaluation assessment as requested by Dr. Chaparro.  He requires evaluation and anesthesia risk assessment prior to undergoing surgery/procedure for treatment of left knee medial meniscal tear, scheduled to undergo arthroscopy with medial meniscus debridement and possible decompression of the Baker's cyst.    Proposed Surgery/ Procedure:  left knee arthroscopy with medial meniscus debridement and possible decompression of the Baker's cyst.  Date of Surgery/ Procedure: 3/6/2020  Time of Surgery/ Procedure:   Hospital/Surgical Facility: [facility]  Fax number for surgical facility:   Primary Physician: Beni Murrieta  Type of Anesthesia Anticipated: General    Patient has a Health Care Directive or Living Will:  NO    1. NO - Do you have a history of heart attack, stroke, stent, bypass or surgery on an artery in the head, neck, heart or legs?  2. NO - Do you ever have any pain or discomfort in your chest?  3. NO - Do you have a history of  Heart Failure?  4. NO - Are you troubled by shortness of breath when: walking on the level, up a slight hill or at night?  5. NO - Do you currently have a cold, bronchitis or other respiratory infection?  6. NO - Do you have a cough, shortness of breath or wheezing?  7. NO - Do you sometimes get pains in the calves of your legs when you walk?  8. NO - Do you or anyone in your family have previous history of blood clots?  9. NO - Do you or does anyone in your family have a serious bleeding problem such as prolonged bleeding following surgeries or cuts?  10. NO - Have you ever had problems with anemia or been told to take iron pills?  11. NO - Have you had any abnormal blood loss such as black, tarry or bloody stools, or abnormal vaginal  bleeding?  12. NO - Have you ever had a blood transfusion?  13. NO - Have you or any of your relatives ever had problems with anesthesia?  14. NO - Do you have sleep apnea, excessive snoring or daytime drowsiness?  15. NO - Do you have any prosthetic heart valves?  16. NO - Do you have prosthetic joints?  17. NO - Is there any chance that you may be pregnant?      HPI:     HPI related to upcoming procedure: patient had a work related injury that resulted in a medial meniscal tear on the left knee with possible injury to the ACL and a noted Baker's cyst.        See problem list for active medical problems.  Problems all longstanding and stable, except as noted/documented.  See ROS for pertinent symptoms related to these conditions.      MEDICAL HISTORY:     Patient Active Problem List    Diagnosis Date Noted     Other tear of medial meniscus of left knee as current injury, subsequent encounter 02/25/2020     Priority: Medium     Added automatically from request for surgery [id number]       Rupture of anterior cruciate ligament of left knee, subsequent encounter 02/25/2020     Priority: Medium     Added automatically from request for surgery [id number]       Neuroma of hand left middle finger, radial side, level of PIP joint. 10/22/2018     Priority: Medium     Tobacco use disorder 05/14/2016     Priority: Medium      Past Medical History:   Diagnosis Date     NO ACTIVE PROBLEMS      Past Surgical History:   Procedure Laterality Date     APPENDECTOMY  1983    ruptured appy     Current Outpatient Medications   Medication Sig Dispense Refill     IBUPROFEN PO Take 200 mg by mouth       OTC products: None, except as noted above    No Known Allergies   Latex Allergy: NO    Social History     Tobacco Use     Smoking status: Former Smoker     Packs/day: 0.20     Types: Cigarettes     Smokeless tobacco: Former User   Substance Use Topics     Alcohol use: Yes     Alcohol/week: 0.0 standard drinks     Comment: socially, not heavy      History   Drug Use No       REVIEW OF SYSTEMS:   CONSTITUTIONAL: NEGATIVE for fever, chills, change in weight  INTEGUMENTARY/SKIN: NEGATIVE for worrisome rashes, moles or lesions  EYES: NEGATIVE for vision changes or irritation  ENT/MOUTH: NEGATIVE for ear, mouth and throat problems  RESP: NEGATIVE for significant cough or SOB  BREAST: NEGATIVE for masses, tenderness or discharge  CV: NEGATIVE for chest pain, palpitations or peripheral edema  GI: NEGATIVE for nausea, abdominal pain, heartburn, or change in bowel habits, POSITIVE hx of heartburn or reflux, treated with Tums  : NEGATIVE for frequency, dysuria, or hematuria  MUSCULOSKELETAL: NEGATIVE for significant arthralgias or myalgia  NEURO: NEGATIVE for weakness, dizziness or paresthesias  ENDOCRINE: NEGATIVE for temperature intolerance, skin/hair changes  HEME: NEGATIVE for bleeding problems  PSYCHIATRIC: NEGATIVE for changes in mood or affect    EXAM:   /76   Pulse 80   Temp 98.7  F (37.1  C) (Temporal)   Resp 18   Wt 78.5 kg (173 lb)   SpO2 96%   BMI 23.46 kg/m      GENERAL APPEARANCE: healthy, alert and no distress     EYES: EOMI,  PERRL     HENT: ear canals and TM's normal and nose and mouth without ulcers or lesions     NECK: no adenopathy, no asymmetry, masses, or scars and thyroid normal to palpation     RESP: lungs clear to auscultation - no rales, rhonchi or wheezes     CV: regular rates and rhythm, normal S1 S2, no S3 or S4 and no murmur, click or rub     MS: extremities normal- no gross deformities noted, no evidence of inflammation in joints, some tenderness to movement in the left knee     SKIN: no suspicious lesions or rashes     NEURO: Normal strength and tone, sensory exam grossly normal, mentation intact and speech normal     PSYCH: mentation appears normal. and affect normal/bright    DIAGNOSTICS:   EKG: Not indicated due to non-vascular surgery and low risk of event (age <65 and without cardiac risk factors)    No labs  indicated today.    IMPRESSION:   Reason for surgery/procedure: Tear of medial meniscus of left knee, Baker's cyst and possible injury to the ACL, scheduled for an arthroscopic meniscal   Diagnosis/reason for consult: Preoperative consultation for clearance for surgery and anesthesia.     The proposed surgical procedure is considered LOW risk.    REVISED CARDIAC RISK INDEX  The patient has the following serious cardiovascular risks for perioperative complications such as (MI, PE, VFib and 3  AV Block):  No serious cardiac risks  INTERPRETATION: 0 risks: Class I (very low risk - 0.4% complication rate)    The patient has the following additional risks for perioperative complications:  No identified additional risks      ICD-10-CM    1. Preop general physical exam Z01.818    2. Other tear of medial meniscus of left knee as current injury, subsequent encounter S83.242D      I did discuss with the patient that he could use a cryo cast to help ice the area of surgery afterward, and gave him resources on where he would be able to get that.  Patient was in clinic today with the  from his employer, as this is a workers comp injury.    RECOMMENDATIONS:     --Patient is on no chronic medications    APPROVAL GIVEN to proceed with proposed procedure, without further diagnostic evaluation     This document serves as a record of the services and decisions personally performed and made by Beni Degroot MD.  It was created on his behalf by Marilin Christianson, a trained medical student and scribe.  The creation of this record is based on the provider's personal observations and the statements of the patient.     Marilin Christianson, MPH, MS3  March 5, 2020    I agree with the PFSH and ROS as completed by the MS.  The remainder of the encounter was performed by me and scribed by the MS.  The scribed note accurately reflects my personal services and the decisions made by me.     Electronically signed by:  Beni Murrieta  M.D.  3/5/2020       Copy of this evaluation report is provided to requesting physician.    [facility] Preop Guidelines    Revised Cardiac Risk Index

## 2020-03-05 NOTE — NURSING NOTE
Chief Complaint   Patient presents with     Pre-Op Exam     left knee arthroscopy with medial meniscus debridement and possible decompression of the Durham's cyst. Dr. Chaparro 3/6/2020     MP/MA

## 2020-03-06 ENCOUNTER — HOSPITAL ENCOUNTER (OUTPATIENT)
Facility: CLINIC | Age: 45
Discharge: HOME | End: 2020-03-06
Attending: ORTHOPAEDIC SURGERY | Admitting: ORTHOPAEDIC SURGERY
Payer: OTHER MISCELLANEOUS

## 2020-03-06 ENCOUNTER — ANESTHESIA (OUTPATIENT)
Dept: SURGERY | Facility: CLINIC | Age: 45
End: 2020-03-06
Payer: OTHER MISCELLANEOUS

## 2020-03-06 VITALS
BODY MASS INDEX: 23.43 KG/M2 | WEIGHT: 173 LBS | TEMPERATURE: 97.5 F | HEIGHT: 72 IN | OXYGEN SATURATION: 97 % | HEART RATE: 47 BPM | DIASTOLIC BLOOD PRESSURE: 87 MMHG | RESPIRATION RATE: 16 BRPM | SYSTOLIC BLOOD PRESSURE: 122 MMHG

## 2020-03-06 DIAGNOSIS — S83.512D RUPTURE OF ANTERIOR CRUCIATE LIGAMENT OF LEFT KNEE, SUBSEQUENT ENCOUNTER: ICD-10-CM

## 2020-03-06 DIAGNOSIS — S83.242D OTHER TEAR OF MEDIAL MENISCUS OF LEFT KNEE AS CURRENT INJURY, SUBSEQUENT ENCOUNTER: ICD-10-CM

## 2020-03-06 PROCEDURE — 25000132 ZZH RX MED GY IP 250 OP 250 PS 637: Performed by: NURSE ANESTHETIST, CERTIFIED REGISTERED

## 2020-03-06 PROCEDURE — 37000008 ZZH ANESTHESIA TECHNICAL FEE, 1ST 30 MIN: Performed by: ORTHOPAEDIC SURGERY

## 2020-03-06 PROCEDURE — 36000058 ZZH SURGERY LEVEL 3 EA 15 ADDTL MIN: Performed by: ORTHOPAEDIC SURGERY

## 2020-03-06 PROCEDURE — 27210794 ZZH OR GENERAL SUPPLY STERILE: Performed by: ORTHOPAEDIC SURGERY

## 2020-03-06 PROCEDURE — 29881 ARTHRS KNE SRG MNISECTMY M/L: CPT | Mod: LT | Performed by: ORTHOPAEDIC SURGERY

## 2020-03-06 PROCEDURE — 25000125 ZZHC RX 250: Performed by: NURSE ANESTHETIST, CERTIFIED REGISTERED

## 2020-03-06 PROCEDURE — 25000128 H RX IP 250 OP 636: Performed by: PHYSICIAN ASSISTANT

## 2020-03-06 PROCEDURE — 37000009 ZZH ANESTHESIA TECHNICAL FEE, EACH ADDTL 15 MIN: Performed by: ORTHOPAEDIC SURGERY

## 2020-03-06 PROCEDURE — 25000132 ZZH RX MED GY IP 250 OP 250 PS 637: Performed by: PHYSICIAN ASSISTANT

## 2020-03-06 PROCEDURE — 71000027 ZZH RECOVERY PHASE 2 EACH 15 MINS: Performed by: ORTHOPAEDIC SURGERY

## 2020-03-06 PROCEDURE — 25000566 ZZH SEVOFLURANE, EA 15 MIN: Performed by: ORTHOPAEDIC SURGERY

## 2020-03-06 PROCEDURE — 25000128 H RX IP 250 OP 636: Performed by: NURSE ANESTHETIST, CERTIFIED REGISTERED

## 2020-03-06 PROCEDURE — 71000014 ZZH RECOVERY PHASE 1 LEVEL 2 FIRST HR: Performed by: ORTHOPAEDIC SURGERY

## 2020-03-06 PROCEDURE — 40000306 ZZH STATISTIC PRE PROC ASSESS II: Performed by: ORTHOPAEDIC SURGERY

## 2020-03-06 PROCEDURE — 36000056 ZZH SURGERY LEVEL 3 1ST 30 MIN: Performed by: ORTHOPAEDIC SURGERY

## 2020-03-06 PROCEDURE — 25800030 ZZH RX IP 258 OP 636: Performed by: NURSE ANESTHETIST, CERTIFIED REGISTERED

## 2020-03-06 PROCEDURE — 25000128 H RX IP 250 OP 636: Performed by: ORTHOPAEDIC SURGERY

## 2020-03-06 PROCEDURE — 29881 ARTHRS KNE SRG MNISECTMY M/L: CPT | Mod: AS | Performed by: PHYSICIAN ASSISTANT

## 2020-03-06 RX ORDER — HYDROMORPHONE HYDROCHLORIDE 1 MG/ML
.3-.5 INJECTION, SOLUTION INTRAMUSCULAR; INTRAVENOUS; SUBCUTANEOUS EVERY 10 MIN PRN
Status: DISCONTINUED | OUTPATIENT
Start: 2020-03-06 | End: 2020-03-06 | Stop reason: HOSPADM

## 2020-03-06 RX ORDER — FENTANYL CITRATE 50 UG/ML
25-50 INJECTION, SOLUTION INTRAMUSCULAR; INTRAVENOUS
Status: DISCONTINUED | OUTPATIENT
Start: 2020-03-06 | End: 2020-03-06 | Stop reason: HOSPADM

## 2020-03-06 RX ORDER — HYDROCODONE BITARTRATE AND ACETAMINOPHEN 5; 325 MG/1; MG/1
1-2 TABLET ORAL EVERY 4 HOURS PRN
Qty: 20 TABLET | Refills: 0 | Status: SHIPPED | OUTPATIENT
Start: 2020-03-06 | End: 2020-09-17

## 2020-03-06 RX ORDER — ACETAMINOPHEN 325 MG/1
975 TABLET ORAL ONCE
Status: COMPLETED | OUTPATIENT
Start: 2020-03-06 | End: 2020-03-06

## 2020-03-06 RX ORDER — CEFAZOLIN SODIUM 1 G/3ML
1 INJECTION, POWDER, FOR SOLUTION INTRAMUSCULAR; INTRAVENOUS SEE ADMIN INSTRUCTIONS
Status: DISCONTINUED | OUTPATIENT
Start: 2020-03-06 | End: 2020-03-06 | Stop reason: HOSPADM

## 2020-03-06 RX ORDER — PROPOFOL 10 MG/ML
INJECTION, EMULSION INTRAVENOUS CONTINUOUS PRN
Status: DISCONTINUED | OUTPATIENT
Start: 2020-03-06 | End: 2020-03-06

## 2020-03-06 RX ORDER — DIMENHYDRINATE 50 MG/ML
25 INJECTION, SOLUTION INTRAMUSCULAR; INTRAVENOUS
Status: DISCONTINUED | OUTPATIENT
Start: 2020-03-06 | End: 2020-03-06 | Stop reason: HOSPADM

## 2020-03-06 RX ORDER — LIDOCAINE HYDROCHLORIDE 20 MG/ML
INJECTION, SOLUTION INFILTRATION; PERINEURAL PRN
Status: DISCONTINUED | OUTPATIENT
Start: 2020-03-06 | End: 2020-03-06

## 2020-03-06 RX ORDER — ONDANSETRON 2 MG/ML
INJECTION INTRAMUSCULAR; INTRAVENOUS PRN
Status: DISCONTINUED | OUTPATIENT
Start: 2020-03-06 | End: 2020-03-06

## 2020-03-06 RX ORDER — ONDANSETRON 4 MG/1
4 TABLET, ORALLY DISINTEGRATING ORAL
Status: DISCONTINUED | OUTPATIENT
Start: 2020-03-06 | End: 2020-03-06 | Stop reason: HOSPADM

## 2020-03-06 RX ORDER — PROPOFOL 10 MG/ML
INJECTION, EMULSION INTRAVENOUS PRN
Status: DISCONTINUED | OUTPATIENT
Start: 2020-03-06 | End: 2020-03-06

## 2020-03-06 RX ORDER — OXYCODONE HYDROCHLORIDE 5 MG/1
5 TABLET ORAL
Status: COMPLETED | OUTPATIENT
Start: 2020-03-06 | End: 2020-03-06

## 2020-03-06 RX ORDER — CEFAZOLIN SODIUM 2 G/100ML
2 INJECTION, SOLUTION INTRAVENOUS
Status: COMPLETED | OUTPATIENT
Start: 2020-03-06 | End: 2020-03-06

## 2020-03-06 RX ORDER — NALOXONE HYDROCHLORIDE 0.4 MG/ML
.1-.4 INJECTION, SOLUTION INTRAMUSCULAR; INTRAVENOUS; SUBCUTANEOUS
Status: DISCONTINUED | OUTPATIENT
Start: 2020-03-06 | End: 2020-03-06 | Stop reason: HOSPADM

## 2020-03-06 RX ORDER — FENTANYL CITRATE 50 UG/ML
INJECTION, SOLUTION INTRAMUSCULAR; INTRAVENOUS PRN
Status: DISCONTINUED | OUTPATIENT
Start: 2020-03-06 | End: 2020-03-06

## 2020-03-06 RX ORDER — SODIUM CHLORIDE, SODIUM LACTATE, POTASSIUM CHLORIDE, CALCIUM CHLORIDE 600; 310; 30; 20 MG/100ML; MG/100ML; MG/100ML; MG/100ML
INJECTION, SOLUTION INTRAVENOUS CONTINUOUS
Status: DISCONTINUED | OUTPATIENT
Start: 2020-03-06 | End: 2020-03-06 | Stop reason: HOSPADM

## 2020-03-06 RX ORDER — ACETAMINOPHEN 325 MG/1
975 TABLET ORAL ONCE
Status: DISCONTINUED | OUTPATIENT
Start: 2020-03-06 | End: 2020-03-06

## 2020-03-06 RX ORDER — KETOROLAC TROMETHAMINE 30 MG/ML
INJECTION, SOLUTION INTRAMUSCULAR; INTRAVENOUS PRN
Status: DISCONTINUED | OUTPATIENT
Start: 2020-03-06 | End: 2020-03-06

## 2020-03-06 RX ORDER — DEXAMETHASONE SODIUM PHOSPHATE 10 MG/ML
INJECTION, SOLUTION INTRAMUSCULAR; INTRAVENOUS PRN
Status: DISCONTINUED | OUTPATIENT
Start: 2020-03-06 | End: 2020-03-06

## 2020-03-06 RX ORDER — AMOXICILLIN 250 MG
1-2 CAPSULE ORAL 2 TIMES DAILY
Qty: 20 TABLET | Refills: 0 | Status: SHIPPED | OUTPATIENT
Start: 2020-03-06 | End: 2020-09-17

## 2020-03-06 RX ORDER — OXYCODONE HYDROCHLORIDE 5 MG/1
5 TABLET ORAL EVERY 4 HOURS PRN
Status: DISCONTINUED | OUTPATIENT
Start: 2020-03-06 | End: 2020-03-06 | Stop reason: HOSPADM

## 2020-03-06 RX ORDER — ROPIVACAINE HYDROCHLORIDE 2 MG/ML
INJECTION, SOLUTION EPIDURAL; INFILTRATION; PERINEURAL PRN
Status: DISCONTINUED | OUTPATIENT
Start: 2020-03-06 | End: 2020-03-06 | Stop reason: HOSPADM

## 2020-03-06 RX ORDER — GABAPENTIN 300 MG/1
300 CAPSULE ORAL ONCE
Status: COMPLETED | OUTPATIENT
Start: 2020-03-06 | End: 2020-03-06

## 2020-03-06 RX ADMIN — MIDAZOLAM 2 MG: 1 INJECTION INTRAMUSCULAR; INTRAVENOUS at 07:21

## 2020-03-06 RX ADMIN — OXYCODONE HYDROCHLORIDE 5 MG: 5 TABLET ORAL at 08:43

## 2020-03-06 RX ADMIN — ACETAMINOPHEN 975 MG: 325 TABLET, FILM COATED ORAL at 06:32

## 2020-03-06 RX ADMIN — ONDANSETRON 4 MG: 2 INJECTION INTRAMUSCULAR; INTRAVENOUS at 07:43

## 2020-03-06 RX ADMIN — PROPOFOL 200 MG: 10 INJECTION, EMULSION INTRAVENOUS at 07:26

## 2020-03-06 RX ADMIN — DEXAMETHASONE SODIUM PHOSPHATE 10 MG: 10 INJECTION, SOLUTION INTRAMUSCULAR; INTRAVENOUS at 07:40

## 2020-03-06 RX ADMIN — PROPOFOL 50 MG: 10 INJECTION, EMULSION INTRAVENOUS at 07:27

## 2020-03-06 RX ADMIN — FENTANYL CITRATE 50 MCG: 50 INJECTION, SOLUTION INTRAMUSCULAR; INTRAVENOUS at 07:26

## 2020-03-06 RX ADMIN — PROPOFOL 100 MCG/KG/MIN: 10 INJECTION, EMULSION INTRAVENOUS at 07:30

## 2020-03-06 RX ADMIN — FENTANYL CITRATE 50 MCG: 50 INJECTION, SOLUTION INTRAMUSCULAR; INTRAVENOUS at 07:40

## 2020-03-06 RX ADMIN — GABAPENTIN 300 MG: 300 CAPSULE ORAL at 06:42

## 2020-03-06 RX ADMIN — KETOROLAC TROMETHAMINE 30 MG: 30 INJECTION, SOLUTION INTRAMUSCULAR at 07:57

## 2020-03-06 RX ADMIN — LIDOCAINE HYDROCHLORIDE 100 MG: 20 INJECTION, SOLUTION INFILTRATION; PERINEURAL at 07:26

## 2020-03-06 RX ADMIN — LIDOCAINE HYDROCHLORIDE 1 ML: 10 INJECTION, SOLUTION EPIDURAL; INFILTRATION; INTRACAUDAL; PERINEURAL at 06:50

## 2020-03-06 RX ADMIN — CEFAZOLIN SODIUM 2 G: 2 INJECTION, SOLUTION INTRAVENOUS at 07:31

## 2020-03-06 RX ADMIN — SODIUM CHLORIDE, POTASSIUM CHLORIDE, SODIUM LACTATE AND CALCIUM CHLORIDE: 600; 310; 30; 20 INJECTION, SOLUTION INTRAVENOUS at 06:51

## 2020-03-06 ASSESSMENT — MIFFLIN-ST. JEOR: SCORE: 1702.72

## 2020-03-06 ASSESSMENT — LIFESTYLE VARIABLES: TOBACCO_USE: 1

## 2020-03-06 NOTE — OP NOTE
PREOPERATIVE DIAGNOSIS: Other tear of medial meniscus of left knee as current injury, subsequent encounter [S83.188D]  Rupture of anterior cruciate ligament of left knee, subsequent encounter [S83.512D]   POSTOPERATIVE DIAGNOSIS: Same  PROCEDURE: Procedure(s):  left knee arthroscopy with partial medial menisectomy   SURGEON: Burak Chaparro MD   ASSISTANT: Bebeto Monique PA-C (A Physician Assisstant was necessary for his expertise, exposure and surgical assistance throughout the case.)  ANESTHESIA: General  COMPLICATIONS: None.   ESTIMATED BLOOD LOSS: Minimal.   COUNTS: Correct.   DISPOSITION: To PACU in stable condition.   GROSS FINDINGS:    Patellofemoral Compartment:25 percent, Grade 2 chondromalacia   Medial Compartment:10 percentGrade 2 chondromalacia, Meniscus tear of the posterior horn    Intercondylar notch: ACL is intact with negative intraoperative Lachman   Lateral Compartment:10 percentGrade 3 chondromalacia,No tear  NOTE/INDICATIONS: Mr. Sandee Sumner is a 44 year old year-old  who presents for the above. He was evaluated in my office. He received a preoperative MRI which confirmed our clinical diagnosis of a medial meniscus tear. Risks, benefits, alternatives, complications, limitations, and anticipated postop course were discussed at length. Risks included but not limited to infection, bleeding, blood clots, scar, scar tenderness, stiffness, skin problems with healing, and failure to relieve all symptoms were discussed at length. Also discussed surgery done under general anesthesia include risks of aspiration, strokes, heart attacks, and deaths. Nerve block was also discussed. he agreed and would like to proceed with surgery.  All questions were answered.     DETAILS OF PROCEDURE: he was met in the preop care unit. Again, informed consent was verified. The appropriate extremity was marked. He was wheeled back to operative suite  at [facility]. Mr. Sandee Sumner was transferred off the cart  to the OR table without incident. he was placed under general anesthesia.  Preoperative Lachman test was negative.  He had no significant increase in laxity compared to the opposite side.  All bony prominences were padded. Next, his left lower extremity was prepped and draped in a normal manner. After a time-out had been performed, his right knee was injected with local anesthetic. This was done under aseptic conditions.  A superior medial portal was made and the outflow was introduced.  An anterior lateral portal was then made and the arthroscope was then introduced into the patellofemoral joint. A diagnostic arthroscopy with the above gross findings in each and the compartments was then performed. Attention was turned to the medial compartment. Under direct visualization, the medial portal was established. The medial meniscus was noted to be torn.  Utilizing a combination of a biter and a shaver from both the medial and lateral portals, the medial meniscus was trimmed  back to a smooth and stable margin. This was stable to probing.  Evaluation of the articular surfaces showed Grade 2 chondromalacia in the  Medial femoral condyle and Lateral femoral condyle.   the knee was copiously irrigated and the shaver was used to remove any remaining debris from the outflow.  This was then suctioned dry. Instruments were removed. The knee was injected with local anesthetic.  A sterile dressing was applied, followed by an Ace wrap from his foot to thigh. he tolerated the procedure well. When deemed safe, per Anesthesia, the patient was transferred to the PACU in stable condition, to be discharged home with pain medications, weightbearing as tolerated. A followup has been scheduled in the office.     Burak Chaparro MD

## 2020-03-06 NOTE — ANESTHESIA POSTPROCEDURE EVALUATION
Patient: Aleks Sumner    Procedure(s):  left knee arthroscopy with partial medial menisectomy    Diagnosis:Other tear of medial meniscus of left knee as current injury, subsequent encounter [S83.242D]  Rupture of anterior cruciate ligament of left knee, subsequent encounter [S83.512D]  Diagnosis Additional Information: No value filed.    Anesthesia Type:  General, LMA    Note:  Anesthesia Post Evaluation    Patient location during evaluation: Phase 2  Patient participation: Able to fully participate in evaluation  Level of consciousness: awake and alert  Pain management: adequate  Airway patency: patent  Cardiovascular status: acceptable and stable  Respiratory status: acceptable and room air  Hydration status: acceptable  PONV: none     Anesthetic complications: None    Comments:  Patient was happy with the anesthesia care received and no anesthesia related complications were noted.  I will follow up with the patient again if it is needed.        Last vitals:  Vitals:    03/06/20 0930 03/06/20 0945 03/06/20 1000   BP: 122/87     Pulse: (!) 47     Resp: 16     Temp:      SpO2: 97% 98% 97%         Electronically Signed By: SHANNA Yoon CRNA  March 6, 2020  10:37 AM

## 2020-03-06 NOTE — ANESTHESIA PREPROCEDURE EVALUATION
Anesthesia Pre-Procedure Evaluation    Patient: Aleks Sumner   MRN: [id number] : 1975          Preoperative Diagnosis: Other tear of medial meniscus of left knee as current injury, subsequent encounter [S83.242D]  Rupture of anterior cruciate ligament of left knee, subsequent encounter [S83.512D]    Procedure(s):  left knee arthroscopy with medial meniscus debridement and possible decompression of the Baker's cyst.    Past Medical History:   Diagnosis Date     NO ACTIVE PROBLEMS      Past Surgical History:   Procedure Laterality Date     APPENDECTOMY      ruptured appy       Anesthesia Evaluation     . Pt has had prior anesthetic. Type: General and MAC    No history of anesthetic complications          ROS/MED HX    ENT/Pulmonary:     (+)tobacco use, Past use 0.2ppd packs/day  , . .    Neurologic:  - neg neurologic ROS     Cardiovascular:  - neg cardiovascular ROS   (+) ----. : . . . :. . No previous cardiac testing       METS/Exercise Tolerance:  >4 METS   Hematologic:  - neg hematologic  ROS       Musculoskeletal:  - neg musculoskeletal ROS       GI/Hepatic:     (+) GERD Asymptomatic on medication,       Renal/Genitourinary:  - ROS Renal section negative       Endo:  - neg endo ROS       Psychiatric:  - neg psychiatric ROS       Infectious Disease:  - neg infectious disease ROS       Malignancy:      - no malignancy   Other:    - neg other ROS                      Physical Exam  Normal systems: cardiovascular and pulmonary    Airway   Mallampati: II  TM distance: >3 FB  Neck ROM: full    Dental   (+) implants  Comment: Upper implants and lower permanent bridge    Cardiovascular   Rhythm and rate: regular and normal      Pulmonary    breath sounds clear to auscultation            Lab Results   Component Value Date    GLC 55 (L) 10/15/2018       Preop Vitals  BP Readings from Last 3 Encounters:   20 110/76   20 110/70   20 100/60    Pulse Readings from Last 3 Encounters:    03/05/20 80   10/22/18 64   10/15/18 87      Resp Readings from Last 3 Encounters:   03/05/20 18   10/15/18 16   05/29/17 12    SpO2 Readings from Last 3 Encounters:   03/05/20 96%   10/15/18 98%   05/29/17 98%      Temp Readings from Last 1 Encounters:   03/05/20 98.7  F (37.1  C) (Temporal)    Ht Readings from Last 1 Encounters:   02/25/20 1.829 m (6')      Wt Readings from Last 1 Encounters:   03/05/20 78.5 kg (173 lb)    Estimated body mass index is 23.46 kg/m  as calculated from the following:    Height as of 2/25/20: 1.829 m (6').    Weight as of 3/5/20: 78.5 kg (173 lb).       Anesthesia Plan      History & Physical Review  History and physical reviewed and following examination; no interval change.    ASA Status:  2 .    NPO Status:  > 8 hours    Plan for General and LMA with Intravenous and Propofol induction. Maintenance will be Inhalation and Balanced.    PONV prophylaxis:  Ondansetron (or other 5HT-3), Dexamethasone or Solumedrol and Other (See comment) (prop gtt)       Postoperative Care  Postoperative pain management:  IV analgesics, Multi-modal analgesia and Oral pain medications.      Consents  Anesthetic plan, risks, benefits and alternatives discussed with:  Patient.  Use of blood products discussed: No .   .                 SHANNA Yoon CRNA

## 2020-03-06 NOTE — ANESTHESIA CARE TRANSFER NOTE
Patient: Aleks Sumner    Procedure(s):  left knee arthroscopy with partial medial menisectomy    Diagnosis: Other tear of medial meniscus of left knee as current injury, subsequent encounter [S83.242D]  Rupture of anterior cruciate ligament of left knee, subsequent encounter [S83.512D]  Diagnosis Additional Information: No value filed.    Anesthesia Type:   General, LMA     Note:  Airway :Face Mask and Oral Airway  Patient transferred to:PACU  Handoff Report: Identifed the Patient, Identified the Reponsible Provider, Reviewed the pertinent medical history, Discussed the surgical course, Reviewed Intra-OP anesthesia mangement and issues during anesthesia, Set expectations for post-procedure period and Allowed opportunity for questions and acknowledgement of understanding      Vitals: (Last set prior to Anesthesia Care Transfer)    CRNA VITALS  3/6/2020 0735 - 3/6/2020 0811      3/6/2020             Pulse:  50    SpO2:  99 %                Electronically Signed By: SHANNA Yoon CRNA  March 6, 2020  8:11 AM

## 2020-03-06 NOTE — DISCHARGE INSTRUCTIONS
General Knee Arthroscopy Discharge Instructions      [phone]  Bone and Joint Service Line for issues or concerns      General Care:  After surgery you may feel tired/sleepy. This is normal. Please have someone stay with you for 24 hours after surgery. You should avoid driving for 1-2 days after surgery, as your reaction time may be slow. You should not drive at all if you have had surgery on your arms, right leg and/or are taking narcotic pain medications until released by your doctor. If you have any question along the way please contact the office. If you feel it is an issue cannot wait for normal office hours, contact the on-call physician. Use ice on the knee intermittently. Elevate your leg with a couple of pillows placed under your ankle/calf area. Do this for the first couple days frequently.     Bandages:   Change your bandage after the first 72 hours. You may use Band-Aids or sterile gauze with an ACE wrap. It s normal to have some blood-tinged fluid on your bandages, this will usually continue for the first day or two. Keep the area clean and dry. Do not apply any ointments. Use the ACE wrap until you are seen at your follow up.     Bathing:  Do not submerge your incision in water such as a bath or pool. It is ok to shower after removing your initial bandage after the first 2 days from surgery. Avoid any excessively hot showers, baths, or hot tubes after surgery.     Follow up:  Your follow up appointment should already be scheduled. If its not, please call the office to verify an appointment 10 days after surgery.     Diet:  Start with non-alcoholic liquids at first, particularly water or sports drinks after surgery. Progress to bland foods such as crackers and bread and finally to your normal diet if you have no problems.     Pain control:  Take your pain medications as prescribed. Use the pain medication liberally over the first 48 hour, then taper your use. These medications may make you sleepy.  Do not drive, operate equipment, or drink alcohol when taking these. Using ice intermittently (15 minutes on and 15 minutes off) can also help. You may take Tylenol or extra strength Tylenol (Generic name is acetaminophen) or NSAIDs (Motrin, Ibuprofen, Aleve, Naproxen) as directed on the bottle for additional relief or in place of the prescribed pain medications as your pain gets better. If the medications cause a reaction such as nausea or skin rash, stop taking them and contact your doctor. Please plan accordingly, pain medications will not be re-filled on the weekends or at night. Call the office during the day if you need more medications.    Crutches:  Use crutches/walker/cane only if needed after surgery. You can stop using these when you feel stable on your feet.     Braces:  Some surgeries will require the use of a brace. Use this brace as directed.     Physical Therapy:  Often times you will not require formal physical therapy for this surgery.  If home exercises or formal physical therapy is needed, your doctor will direct you on this at your follow up visit.    Activity:  Unless otherwise instructed, you can weight bear as tolerated. While laying or sitting down you should straighten your knee all the way out and then gently work on bending the knee back. Do not worry at first if your knee feels stiff and will not bend like normal, this will get better.     Dressing:  Keep dressing on for the first 2 days, on the 3rd day after surgery you can remove the dressing and do daily dressing changes with gauze and ace or Band-Aids until your follow up appointment. You can always loosen the ace wrap if it feels too tight or you  have numbness or tingling.      Normal findings after surgery:  Numbness and tenderness around the incisions is normal.  You may have bruising around the incisions.  Your knee will be swollen for 3 weeks after surgery. It will feel  tight  to move.   Low grade fevers less than 100.5  degrees Fahrenheit are normal.       When to call the Office:  Temperature greater than 101.5 degrees Fahreheit.  Fever, chills, and increasing pain in the knee.  Excessive drainage from the incisions that include bright red blood.  Drainage from the incisions sites that appear yellow, pus-like, or foul smelling.  Increasing pain the knee not relieved by the prescribed pain medications or ice.  Pain or swelling in your calf area (in back above your ankle)  Any other effects you feel are significant.    ** Ibuprofen due at 2:00pm if needed.    [facility] Same-Day Surgery - Anesthesia  Adult Discharge Orders & Instructions     For 24 hours after surgery    1. Get plenty of rest.  A responsible adult must stay with you for at least 24 hours after you leave the hospital.   2. Do not drive or use heavy equipment.  If you have weakness or tingling, don't drive or use heavy equipment until this feeling goes away.  3. Do not drink alcohol.  4. Avoid strenuous or risky activities.  Ask for help when climbing stairs.   5. You may feel lightheaded.  If so, sit for a few minutes before standing.  Have someone help you get up.   6. You may have a slight fever. Call the doctor if your fever is over 100 F (37.7 C) (taken under the tongue) or lasts longer than 24 hours.  7. You may have a dry mouth, a sore throat, muscle aches or trouble sleeping.  These should go away after 24 hours.  8. Do not make important or legal decisions.  We don t expect you to have any problems from the surgery or treatment you had today. Just in case, here s what to do if you have pain, upset stomach (nausea), bleeding or infection:  Pain:  Take medicines your doctor has prescribed or over-the-counter medicine they have suggested. Resting and using ice packs can help, too. For surgery on an arm or leg, raise it on a pillow to ease swelling. Call your doctor if these methods don t work.  Copyright [employer], Licensed under CC4.0  International  Upset stomach (nausea):  Take anti-nausea medicine approved by your doctor. Drink clear liquids like apple juice, ginger ale, broth or 7-Up. Be sure to drink enough fluids. Rest can help, too. Move to normal foods when you re ready. Bleeding:  In the first 24 hours, you may see a little blood on your dressing, about the size of a quarter. You don t need to worry about this much blood, but if the blood spot keeps getting bigger:    Put pressure on the wound if you can, AND    Call your doctor.  Copyright [employer], Licensed under CC4.0 International  Fever/Infection: Please call your doctor if you have any of these signs:    Redness    Swelling    Wound feels warm    Pain gets worse    Bad-smelling fluid leaks from wound    Fever or chills  Call your doctor for any of the followin.  It has been over 8 to 10 hours since surgery and you are still not able to urinate (pass water).    2.  Headache for over 24 hours.       [facility] Nurse advice line: [phone] (24 hr line)

## 2020-03-06 NOTE — BRIEF OP NOTE
[facility]    Brief Operative Note    Pre-operative diagnosis: Other tear of medial meniscus of left knee as current injury, subsequent encounter [S83.242D]  Rupture of anterior cruciate ligament of left knee, subsequent encounter [S83.512D]  Post-operative diagnosis Same as pre-operative diagnosis    Procedure: Procedure(s):  left knee arthroscopy with partial medial menisectomy  Surgeon: Surgeon(s) and Role:     * Burak Chaparro MD - Primary     * Kalen Monique PA-C - Assisting  Anesthesia: General   Estimated blood loss: Minimal  Drains: None  Specimens: * No specimens in log *  Findings:   None.  Complications: None.  Implants: * No implants in log *

## 2020-03-09 NOTE — PROGRESS NOTES
[facility] Same Day Surgery  Discharge Call Back  Aleks Sumner  1975  MRN: [id number]  Home: [phone] (home) [phone] (work)  PCP: Beni Murrieta    We are calling to see how you're doing since your surgery/procedure with us?   Comments: Doing pretty good  Clinical Questions  1. Have you had time to look at your discharge instructions? Do you have any questions in regards to the instructions?   Comment: yes, questions about ACE wrap and activity restrictions.  2. Do you feel your pain is being controlled with the regimen the surgeon sent you home on? (ie: prescription medications, over the counter pain medications, ice packs)   Comments: yes  3. Have you noticed any drainage on your dressing? Do you know what to do if you have bleeding as a result of your procedure?   Comments: no  4. Have you had any nausea/vomiting? Do you know how to treat this?   Comment: no  5. Have you had any signs/symptoms of infection? (ie: fever, swelling, heat, drainage or redness) Do you know what to do if you have?   Comment: no  6. Do you have a follow up appointment made with your surgeon? Do you have a number to contact them at if you need it?   Comment: yes, yes  Retained Foreign Object (ARIANA, Hemovac, Penrose, Wound Packing, Vaginal Packing, Nasal Splints, Urethral Stents, Ramos Catheter)  1. Do you still have na in place?   2. If the item is still in place, can you review the plan for removal with me? na      You may be randomly selected to fill out a [facility] survey. We would appreciate you taking the time to fill this out. It is important to us if you would answer all of the questions on the survey.

## 2020-03-12 ENCOUNTER — OFFICE VISIT (OUTPATIENT)
Dept: ORTHOPEDICS | Facility: CLINIC | Age: 45
End: 2020-03-12
Payer: OTHER MISCELLANEOUS

## 2020-03-12 VITALS
BODY MASS INDEX: 23.43 KG/M2 | TEMPERATURE: 98.3 F | DIASTOLIC BLOOD PRESSURE: 79 MMHG | WEIGHT: 173 LBS | HEIGHT: 72 IN | SYSTOLIC BLOOD PRESSURE: 125 MMHG

## 2020-03-12 DIAGNOSIS — S83.242D OTHER TEAR OF MEDIAL MENISCUS OF LEFT KNEE AS CURRENT INJURY, SUBSEQUENT ENCOUNTER: Primary | ICD-10-CM

## 2020-03-12 PROCEDURE — 99024 POSTOP FOLLOW-UP VISIT: CPT | Performed by: ORTHOPAEDIC SURGERY

## 2020-03-12 ASSESSMENT — MIFFLIN-ST. JEOR: SCORE: 1702.72

## 2020-03-12 ASSESSMENT — PAIN SCALES - GENERAL: PAINLEVEL: MILD PAIN (3)

## 2020-03-12 NOTE — LETTER
3/12/2020         RE: Aleks Sumner  [address]  [facility] [place] [address]        Dear Colleague,    Thank you for referring your patient, Aleks Sumner, to the [facility]. Please see a copy of my visit note below.    Assessment / Plan   No diagnosis found.     Plan: We are going to allow him to increase activity as tolerated.  He can go back to work without restriction.  If he does well at work without restriction then we will clear him.  In the meantime he is to call if he is unable to work without restriction.  He does have some arthrosis in the knee which may cause him to need an injection in the future.  Follow up PRN     HPI / History / ROS   Aleks Sumner is a 44 year old male who is being seen for   Chief Complaint   Patient presents with     Surgical Followup     DOS: 3/6/2020~left knee arthroscopy with partial medial menisectomy   He is doing very well.  Pain is been well controlled he has minimal swelling.  He says he is back to almost normal.  Does notice a little bit of discomfort with certain positions.    Medications  HYDROcodone-acetaminophen (NORCO) 5-325 MG tablet, Take 1-2 tablets by mouth every 4 hours as needed for moderate to severe pain  IBUPROFEN PO, Take 200 mg by mouth  senna-docusate (SENOKOT-S/PERICOLACE) 8.6-50 MG tablet, Take 1-2 tablets by mouth 2 times daily    No current facility-administered medications on file prior to visit.          Allergies   No Known Allergies     PFSH  Past Medical History:   Diagnosis Date     NO ACTIVE PROBLEMS        Past Surgical History:   Procedure Laterality Date     APPENDECTOMY  1983    ruptured appy     ARTHROSCOPY KNEE WITH MEDIAL MENISCECTOMY Left 3/6/2020    Procedure: left knee arthroscopy with partial medial menisectomy;  Surgeon: Burak Chaparro MD;  Location: [facility] OR       Family History   Problem Relation Age of Onset     Depression Mother         65     Heart Disease Maternal  Grandfather      Other - See Comments Father         66     Other - See Comments Sister         3 yrs younger     Diabetes Paternal Uncle      Obesity Paternal Uncle      Other - See Comments Sister         10 yrs younger     Other - See Comments Daughter         2010     Other - See Comments Son         2011     Other - See Comments Son         2013     Alzheimer Disease Maternal Grandmother      Alzheimer Disease Paternal Grandmother      Other - See Comments Daughter         2001, from another marriage       Social History     Tobacco Use     Smoking status: Former Smoker     Packs/day: 0.20     Types: Cigarettes     Smokeless tobacco: Former User   Substance Use Topics     Alcohol use: Yes     Alcohol/week: 0.0 standard drinks     Comment: socially, not heavy          ROS  Review of Systems   REVIEW OF SYSTEMS  General: negative for, night sweats, dizziness, fatigue  Resp: No shortness of breath and no cough  CV: negative for chest pain, syncope or near-syncope  GI: negative for nausea, vomiting and diarrhea  : negative for dysuria and hematuria  Musculoskeletal: as above  Neurologic: negative for syncope   Hematologic: negative for bleeding disorder    Physical / Results   Physical Exam         Physical Exam:  Vitals: /79   Temp 98.3  F (36.8  C)   Ht 1.829 m (6')   Wt 78.5 kg (173 lb)   BMI 23.46 kg/m    BMI= Body mass index is 23.46 kg/m .  Constitutional: healthy, alert and no acute distress   Psychiatric: mentation appears normal and affect normal/bright  NEURO: no focal deficits  RESP: Normal with easy respirations and no use of accessory muscles to breathe, no audible wheezing or retractions  CV: Pulses are palpable  SKIN: Warm with no erythema  JOINT/EXTREMITIES: He is neurovascular intact.  He has good range of motion.  Trace effusion.  Knee is stable to varus and valgus.  Negative lachman and Teresa      IMAGING:     Independent visualization of the images was performed.    Burak  MD Krysta    Again, thank you for allowing me to participate in the care of your patient.        Sincerely,        Burak Chaparro MD

## 2020-03-12 NOTE — PROGRESS NOTES
Assessment / Plan   No diagnosis found.     Plan: We are going to allow him to increase activity as tolerated.  He can go back to work without restriction.  If he does well at work without restriction then we will clear him.  In the meantime he is to call if he is unable to work without restriction.  He does have some arthrosis in the knee which may cause him to need an injection in the future.  Follow up PRN     HPI / History / ROS   Aleks Sumner is a 44 year old male who is being seen for   Chief Complaint   Patient presents with     Surgical Followup     DOS: 3/6/2020~left knee arthroscopy with partial medial menisectomy   He is doing very well.  Pain is been well controlled he has minimal swelling.  He says he is back to almost normal.  Does notice a little bit of discomfort with certain positions.    Medications  HYDROcodone-acetaminophen (NORCO) 5-325 MG tablet, Take 1-2 tablets by mouth every 4 hours as needed for moderate to severe pain  IBUPROFEN PO, Take 200 mg by mouth  senna-docusate (SENOKOT-S/PERICOLACE) 8.6-50 MG tablet, Take 1-2 tablets by mouth 2 times daily    No current facility-administered medications on file prior to visit.          Allergies   No Known Allergies     PFSH  Past Medical History:   Diagnosis Date     NO ACTIVE PROBLEMS        Past Surgical History:   Procedure Laterality Date     APPENDECTOMY  1983    ruptured appy     ARTHROSCOPY KNEE WITH MEDIAL MENISCECTOMY Left 3/6/2020    Procedure: left knee arthroscopy with partial medial menisectomy;  Surgeon: Burak Chaparro MD;  Location: [facility] OR       Family History   Problem Relation Age of Onset     Depression Mother         65     Heart Disease Maternal Grandfather      Other - See Comments Father         66     Other - See Comments Sister         3 yrs younger     Diabetes Paternal Uncle      Obesity Paternal Uncle      Other - See Comments Sister         10 yrs younger     Other - See Comments Daughter          2010     Other - See Comments Son         2011     Other - See Comments Son         2013     Alzheimer Disease Maternal Grandmother      Alzheimer Disease Paternal Grandmother      Other - See Comments Daughter         2001, from another marriage       Social History     Tobacco Use     Smoking status: Former Smoker     Packs/day: 0.20     Types: Cigarettes     Smokeless tobacco: Former User   Substance Use Topics     Alcohol use: Yes     Alcohol/week: 0.0 standard drinks     Comment: socially, not heavy          ROS  Review of Systems   REVIEW OF SYSTEMS  General: negative for, night sweats, dizziness, fatigue  Resp: No shortness of breath and no cough  CV: negative for chest pain, syncope or near-syncope  GI: negative for nausea, vomiting and diarrhea  : negative for dysuria and hematuria  Musculoskeletal: as above  Neurologic: negative for syncope   Hematologic: negative for bleeding disorder    Physical / Results   Physical Exam         Physical Exam:  Vitals: /79   Temp 98.3  F (36.8  C)   Ht 1.829 m (6')   Wt 78.5 kg (173 lb)   BMI 23.46 kg/m    BMI= Body mass index is 23.46 kg/m .  Constitutional: healthy, alert and no acute distress   Psychiatric: mentation appears normal and affect normal/bright  NEURO: no focal deficits  RESP: Normal with easy respirations and no use of accessory muscles to breathe, no audible wheezing or retractions  CV: Pulses are palpable  SKIN: Warm with no erythema  JOINT/EXTREMITIES: He is neurovascular intact.  He has good range of motion.  Trace effusion.  Knee is stable to varus and valgus.  Negative lachman and Teresa      IMAGING:     Independent visualization of the images was performed.    Burak Chaparro MD

## 2020-03-12 NOTE — LETTER
[address] [address]  Phone: [phone]  Fax: [phone]    March 12, 2020        Aleks Sumner  [address]  [place] [place] [address]          To whom it may concern:    RE: Aleks Sumner    Patient was seen and treated today at our clinic.  He will be off work for 2 weeks.     Please contact me for questions or concerns.      Sincerely,        Burak Chaparro MD

## 2020-03-12 NOTE — LETTER
[address] [address]  Phone: [phone]  Fax: [phone]    March 12, 2020        Aleks Sumner  [address]  [facility] [address]          To whom it may concern:    RE: Aleks Sumner    Patient was seen and treated today at our clinic. Patient may return to work 3/13/20 with no restrictions.     Please contact me for questions or concerns.      Sincerely,        Burak Chaparro MD

## 2020-03-15 ENCOUNTER — HEALTH MAINTENANCE LETTER (OUTPATIENT)
Age: 45
End: 2020-03-15

## 2020-03-23 ENCOUNTER — TELEPHONE (OUTPATIENT)
Dept: FAMILY MEDICINE | Facility: CLINIC | Age: 45
End: 2020-03-23

## 2020-03-23 ENCOUNTER — TELEPHONE (OUTPATIENT)
Dept: ORTHOPEDICS | Facility: CLINIC | Age: 45
End: 2020-03-23

## 2020-03-23 NOTE — TELEPHONE ENCOUNTER
Writing nurse returned call to patient. Patient had questions regarding follow up appointment and forms regarding going back to work. Patient was unclear if he could go back to work. Writing nurse informed him that Dr. Chaparro cleared him for work and if he is unable to work to let the clinic know. Patient reports that he has pain off and on regarding bakers cyst and meniscus tear. Writer suggested he continue to use ice, elevation, and OTC medication such as ibuprofen. Patient will see how he does regarding managing symptoms and returning to work. Will call clinic if needs to discuss further treatment.       Last office visit note:  Plan: We are going to allow him to increase activity as tolerated.  He can go back to work without restriction.  If he does well at work without restriction then we will clear him.  In the meantime he is to call if he is unable to work without restriction.  He does have some arthrosis in the knee which may cause him to need an injection in the future.  Follow up STEVEN Hope RN on 3/23/2020 at 9:53 AM

## 2020-03-23 NOTE — TELEPHONE ENCOUNTER
Patient called back. Please call him back. Patient says he still having pain so not sure if he should be going back to work with no restrictions.

## 2020-03-23 NOTE — TELEPHONE ENCOUNTER
Reason for Call:  Other appointment    Detailed comments: Pt see Dr Chaparro for his W/C post op for his left knee on 3/12 and was told to follow-up in 4 wks.      Phone Number Patient can be reached at: Home number on file [phone] (home)    Best Time: any    Can we leave a detailed message on this number? YES    Call taken on 3/23/2020 at 7:40 AM by Kayley Benjamin

## 2020-03-23 NOTE — LETTER
[address] [address]  Phone: [phone]  Fax: [phone]    March 24, 2020        Aleks Sumner  [address]  [facility] [address]          To whom it may concern:    RE: Aleks Sumner    This patient had knee surgery on 3/6/2020.  The surgery was by Dr. Chaparro who I work with.  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  On 3/27/2020 the patient can return to work without restrictions.  The patient may call in on 3/26/2020 if he is not doing well and we could add restrictions at that time.  Secondary to the COVID-19 virus we are doing a lot of work over the phone to decrease exposure.     Please contact me for questions or concerns.        Sincerely,        Kalen Monique PA-C

## 2020-03-23 NOTE — LETTER
[address] [address]  Phone: [phone]  Fax: [phone]    March 24, 2020        Aleks Sumner  [address]  [facility] [address]          To whom it may concern:    RE: Aleks Sumner    This patient had knee surgery on 3/6/2020.  The surgery was by Dr. Chaparro who I work with.  This patient should be off work through 3/26/2020.  On 3/27/2020 the patient can return to work without restrictions.  The patient may call in on 3/26/2020 if he is not doing well and we could add restrictions at that time.  Secondary to the COVID-19 virus we are doing a lot of work over the phone to decrease exposure.    Please contact me for questions or concerns.      Sincerely,      Kalen Monique PA-C

## 2020-03-23 NOTE — TELEPHONE ENCOUNTER
Reason for Call:  Other call back    Detailed comments: Patient employer ([employer]) calling to confirm work restrictions for patient. Employer needs more clear work restrictions then what they received for there paperwork.     Phone Number Patient can be reached at: Other phone number:  Call employer back at [phone] Jared Amanda    Best Time: ANY    Can we leave a detailed message on this number? YES    Call taken on 3/23/2020 at 11:01 AM by Roxanne Ospina

## 2020-03-23 NOTE — TELEPHONE ENCOUNTER
A note provided 3/12/20 indicates he should be off x 2 weeks then the below is an excerpt from Dr. Chaparro's note.      Surgery date 3/6/20 for left knee arthroscopy with partial medial meniscectomy.     Plan: We are going to allow him to increase activity as tolerated.  He can go back to work without restriction.  If he does well at work without restriction then we will clear him.  In the meantime he is to call if he is unable to work without restriction.  He does have some arthrosis in the knee which may cause him to need an injection in the future.

## 2020-03-23 NOTE — TELEPHONE ENCOUNTER
Route to Dr. Chaparro's [facility]. Jazmin Ferro [facility], March 23, 2020

## 2020-03-24 NOTE — TELEPHONE ENCOUNTER
Letter needs to sate if he can work from home and do online work? If this is ok please refax a new work letter. Fax # info. Below. Or call employer Jared at [phone]. . Thank You Meme

## 2020-03-24 NOTE — TELEPHONE ENCOUNTER
I called this patient had a lengthy discussion with him.  I had a lengthy conversation with him in regards to the above work note.  Dr. Chaparro wrote him a work note on 3/12/2020 that stated off work for 2 weeks which puts him to 3/26/2020.  The patient was concerned and thought he might need restrictions after that.  After further discussion he want to call in on 3/26/2020 if he felt he needed restrictions.  We did talk about several different types of restrictions he could have such as no deep squats and no climbing and no lifting however he wanted to see how he does because he does feel that the knee is getting better.  I did write him a work note that says he is to be off through 3/26/2020 and on 3/27/2020 he could return to work without restrictions.  He will call on 3/26/2020 if he feels he needs restrictions I did talk to Marisa Adam PA-C over text and she will be around on that day and is familiar with this patient.  Hopefully he will be able to go back to work without restrictions and will not need to call.  We took his work note and signed it and brought it to the main entrance of the [facility].

## 2020-03-24 NOTE — TELEPHONE ENCOUNTER
I did a new work note after I called the patient and found out if it was okay if I wrote okay to work from home and do online work.  He is okay with that.  I did rewrite a work note and signed it and had VESTA Fernandez fax to  [phone].

## 2020-03-24 NOTE — TELEPHONE ENCOUNTER
Jared /  for employer called and he needs updated note stating information below.      FAX:  [phone]    Note originally received 3/12/20 stated off work for 2 weeks.

## 2020-03-24 NOTE — TELEPHONE ENCOUNTER
Letter created by Bebeto Monique, letter faxed to number below, copy of letter placed at [employer]  for patient .

## 2020-03-24 NOTE — TELEPHONE ENCOUNTER
RN Patient Contact    Attempt # 1    Was call answered?  No.  Left message on voicemail with information to call me back.    Need to clarify his work restrictions/dates etc.     Daisy KERR, Lead RN, BSN. . .  3/24/2020, 11:26 AM

## 2020-03-26 ENCOUNTER — TELEPHONE (OUTPATIENT)
Dept: ORTHOPEDICS | Facility: CLINIC | Age: 45
End: 2020-03-26

## 2020-03-26 DIAGNOSIS — S83.282A TEAR OF LATERAL MENISCUS OF LEFT KNEE, CURRENT, UNSPECIFIED TEAR TYPE, INITIAL ENCOUNTER: Primary | ICD-10-CM

## 2020-03-26 DIAGNOSIS — M71.22 SYNOVIAL CYST OF LEFT POPLITEAL SPACE: ICD-10-CM

## 2020-03-26 NOTE — TELEPHONE ENCOUNTER
Reason for Call:  Other call back    Detailed comments:Please call Pt as he needs as he needs his work note changed. He wouldlike Kayden or Marisa to give him a call.    Phone Number Patient can be reached at: Home number on file [phone] (home)    Best Time: NA    Can we leave a detailed message on this number? Not Applicable    Call taken on 3/26/2020 at 9:11 AM by Margie Espitia

## 2020-03-26 NOTE — PROGRESS NOTES
Visit with Aleks today.  Patient still has concerns regarding return to work.  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  He still states it is hard to bend his knee at a 90 degree angle and stairs have been difficult.  His workplace does require uneven surfaces as well as climbing at times and slippery surfaces.  He still has swelling and discomfort under his kneecap as well as the posterior knee with a Baker's cyst.  Patient has not been utilizing any type of compression.  He has been elevating only on an ottoman.  He has not been elevating above the heart.  Patient has been working with online and sit down options.  He would like to continue with this as an option.  He states he does have to drive to the [place] to use their Wi-Fi.  He is okay continuing with this.    I discussed with Aleks that swelling is the biggest obstacle at the present time.  With his current swelling and lack of range of motion, he is encouraged to aggressively work on reducing this.  Patient should elevate above heart level minimum 3 times daily.  He is asked to utilize an Ace wrap around his knee from above the knee down to the ankle during the daytime hours.  Patient can utilize 2 tablets of ibuprofen as needed for discomfort.  I did advise him with his current amount of swelling that he should not return to work at this time in a physical sense.    A note was written and revised today with reevaluation by phone scheduled again on April 14, 2020.  Patient will aggressively work on reduction of swelling which in turn will lead to greater mobility.  The reason for the evaluation by phone is due to the current Covid stay at home precautions.

## 2020-03-26 NOTE — LETTER
[address] [address]  Phone: [phone]  Fax: [phone]                April 28, 2020     RE:  Aleks Sumner                                                                                                                                                       [address]  [facility] [address]                 To whom it may concern:     RE: Aleks Castillomaggi     This patient had knee surgery on 3/6/2020.  The surgery was done by Dr. Chaparro who is no longer at [facility].       Patient dispensed a hinged knee brace that he should wear at all times for 1 month.  No kneeling, squatting for 2 weeks.  May do ladders one step at a time  After these time restrictions are completed may return to work w/o restrictions.   Please contact me for questions or concerns.        Sincerely,           Bebeto Monique PA-C

## 2020-03-26 NOTE — LETTER
[address] [address]  [phone]          March 26, 2020    RE:  Aleks Sumner                                                                                                                                                       [address]  [facility] [address]            To whom it may concern:    RE: Aleks Sumner    This patient had knee surgery on 3/6/2020.  The surgery was done by Dr. Chaparro who is no longer at [facility].   This patient should continue work at home options at this time if available.  Patient still has needs for frequent elevation of the leg for his healing which affects mobility.  Anticipate at least another 2 weeks of these light duty options.  He would be able to drive a car on a limited basis but should not be on his feet greater than 2 hours at a time.  We will re-evaluate on April 14 th in regards to his progress.     Please contact me for questions or concerns.        Sincerely,        Marisa Adam PA-C

## 2020-04-09 ENCOUNTER — TELEPHONE (OUTPATIENT)
Dept: ORTHOPEDICS | Facility: CLINIC | Age: 45
End: 2020-04-09

## 2020-04-09 NOTE — TELEPHONE ENCOUNTER
Spoke with patient. He underwent a left knee arthorscopy with partial medial meniscectomy on 3/6/20 with Dr. Chaparro.      His most recent restrictions have him off work through 4/14/20.  He is wondering what his next restrictions will be.     He is also wondering if he would be able to have his bakers cyst drained and he feels this is hindering his mobility. He feels the left knee is swollen and the cyst feels harder like a tennis ball.      He is aware that Bebeto Monique and Dr. Winters are out of the clinic until next week and would like to be called Monday with a plan to see if either is willing to see him to drain the cyst and update his work restrictions.      Will route to both providers.     Assessment negative for any concerning symptoms that would warrant seeking care prior to next week.     I will touch bases with his  rep to make sure a visit next week with possible aspiration would be approved.      rep: Jeff Hager: [phone].     Daisy KERR, Lead RN, BSN. . .  4/9/2020, 9:28 AM

## 2020-04-09 NOTE — TELEPHONE ENCOUNTER
Reason for Call:  Other call back    Detailed comments:  Please call regarding his note for work for his left knee.  His restrictions  today and needs to get an updated one for work.    Phone Number Patient can be reached at: Home number on file [phone] (home)    Best Time: any    Can we leave a detailed message on this number? YES    Call taken on 2020 at 8:24 AM by Kayley Benjamin

## 2020-04-13 NOTE — TELEPHONE ENCOUNTER
I would not recommend draining the cyst.  They usually always recur.    As far as going back to work it depends on if he feels ready in the type of work that he does..

## 2020-04-13 NOTE — TELEPHONE ENCOUNTER
Spoke with patient to go over provider recommendations below. He states that he has the cyst is on the back of the leg, but there is significant swelling underneath the kneecap on the front as well.   Patient feels like things should be progressing more. Since his procedure he only feels he is 5% better.   Patient has been using his ace wrap and it will help the swelling to go down but when he takes it off it swells back up. Patient is still icing, elevating, and resting it.   He is also having pain on both sides of his knee that has been waking up throughout the night.     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     Routing to provider for further recommendations.  Viji Friend CMA on 4/13/2020 at 10:57 AM

## 2020-04-14 ENCOUNTER — OFFICE VISIT (OUTPATIENT)
Dept: ORTHOPEDICS | Facility: CLINIC | Age: 45
End: 2020-04-14
Payer: OTHER MISCELLANEOUS

## 2020-04-14 VITALS
BODY MASS INDEX: 23.43 KG/M2 | WEIGHT: 173 LBS | SYSTOLIC BLOOD PRESSURE: 120 MMHG | HEIGHT: 72 IN | DIASTOLIC BLOOD PRESSURE: 68 MMHG | TEMPERATURE: 98.1 F

## 2020-04-14 DIAGNOSIS — Z48.89 AFTERCARE FOLLOWING SURGERY: ICD-10-CM

## 2020-04-14 DIAGNOSIS — M22.2X2 PATELLOFEMORAL PAIN SYNDROME OF LEFT KNEE: ICD-10-CM

## 2020-04-14 DIAGNOSIS — M71.22 SYNOVIAL CYST OF LEFT POPLITEAL SPACE: Primary | ICD-10-CM

## 2020-04-14 DIAGNOSIS — S83.412A SPRAIN OF MEDIAL COLLATERAL LIGAMENT OF LEFT KNEE, INITIAL ENCOUNTER: ICD-10-CM

## 2020-04-14 DIAGNOSIS — Z98.890 STATUS POST ARTHROSCOPY OF LEFT KNEE: ICD-10-CM

## 2020-04-14 DIAGNOSIS — M17.32 POST-TRAUMATIC OSTEOARTHRITIS OF LEFT KNEE: ICD-10-CM

## 2020-04-14 PROCEDURE — 99024 POSTOP FOLLOW-UP VISIT: CPT | Performed by: PHYSICIAN ASSISTANT

## 2020-04-14 RX ORDER — TRIAMCINOLONE ACETONIDE 40 MG/ML
40 INJECTION, SUSPENSION INTRA-ARTICULAR; INTRAMUSCULAR ONCE
Status: COMPLETED | OUTPATIENT
Start: 2020-04-14 | End: 2020-04-14

## 2020-04-14 RX ADMIN — TRIAMCINOLONE ACETONIDE 40 MG: 40 INJECTION, SUSPENSION INTRA-ARTICULAR; INTRAMUSCULAR at 08:50

## 2020-04-14 ASSESSMENT — PAIN SCALES - GENERAL: PAINLEVEL: MILD PAIN (3)

## 2020-04-14 ASSESSMENT — MIFFLIN-ST. JEOR: SCORE: 1702.72

## 2020-04-14 NOTE — NURSING NOTE
WORK COMP 1/13/2020 - Dispensed 1 Left lateral hinge open patella hinged knee brace, Size Medium, with [facility] agreement signed by patient. Jazmin Ferro CMA, April 14, 2020

## 2020-04-14 NOTE — PATIENT INSTRUCTIONS
Encounter Diagnoses   Name Primary?     Synovial cyst of left popliteal space Yes     Aftercare following surgery      Status post arthroscopy of left knee      Sprain of medial collateral ligament of left knee, initial encounter      Post-traumatic osteoarthritis of left knee      Patellofemoral pain syndrome of left knee      Rest, ice and elevate above heart level as needed for pain control  1.  Exam: On exam it does seem that your MCL is irritated.  The range of motion is okay and there is slight swelling but not enough to drain the knee.  I do feel like he may be still dealing with a Baker's cyst or popliteal cyst also.  This should resolve on its own.  2.  Brace: We decided to do a hinged knee brace for your MCL.  It is possible that the MCL did get stressed during surgery.  I would like you to wear this for 4 to 6 weeks.  Wear it at all times except for hygiene.  That should help at night also.  3.  Medication: We talked about doing Mobic or an anti-inflammatory medication.  We will hold on this because we are doing a cortisone injection.  You can call us back if you would like to add this medication on and we can send it to your pharmacy.  4.  Cortisone injection: We decided to do a cortisone injection today.  This is like putting a high powerful anti-inflammatory into your knee.  This should help with swelling and also calm down some of your arthritis which I do believe got irritated during surgery.  5.  Arthritis: You have areas of arthritis in your knee under your kneecap and also on the inside and outside of your knee.  We discussed this today.  This could flareup here and there.  The cortisone injection should help with that.  6.  Work: Today we wrote a work note that he can go back to work tomorrow with the following restrictions, must wear brace at all times for the next 4 weeks, no  kneeling, ladders, squatting for the next 2 weeks.  Once the restrictions have resolved then you can go back to work  without restrictions.  7.  Therapy: We will have you do straight leg raising to help strengthen your quad and pull your patella in a better position.  This will help with your anterior knee pain.  8. Follow up with Kalen Monique PA-C on an as needed basis.   Cortisone Instructions:     1. You received an injection of cortisone into your left knee today.  2. The joint(s) may be more painful for the first 1-2 days.  3. We ask you to continue to rest the joint(s) for a few more days before resuming regular activities.  4. Pain Medications you can take (as long as your primary care provider allows these meds and you do not have kidney or liver conditions):  Tylenol  Take 1000 mg by mouth every 6 hours as needed; maximum dose 4000 mg a day  Ibuprofen  600 mg every 6 hours as needed; maximum 2400 mg a day  (OK to take tylenol and ibuprofen at the same time)  5. Rest, ice and elevate as needed for pain control  6. Watch for these signs of infection: redness, swelling, drainage, warmth to touch, increased pain, or fever. Call the clinic or make an appointment to be seen if you think you have an infection.  7. If you are diabetic, make sure you keep a close eye on your blood sugars, they can get elevated with cortisone injections.   8. Sometimes it can take 1-2 weeks for it to reach its full effect.    Cortisone Injections  Cortisone is a type of steroid. It can greatly reduce swelling, redness, and irritation (inflammation) and pain. Being injected with cortisone is simple and doesn t take long. Your doctor may ask you questions about your health. Certain health conditions, such as diabetes, can be affected by cortisone.     Your pain may be relieved by a cortisone injection.   Why have a cortisone injection?  Injecting cortisone can relieve pain for anything from a sports injury to arthritis. Your doctor may suggest an injection if rest, splints, or oral medicine doesn t relieve your pain. Injecting cortisone is simpler  than having surgery. And cortisone may provide the lasting pain relief that can help you get out and enjoy life again.  Getting the injection  Your doctor will start by cleaning and occasionally numbing your skin at the injection site. Next you ll be injected with local anesthetics (for short-term pain relief) and cortisone. The injection may last a few moments. A small bandage will be put over the injection site. You ll then be ready to go home.  After your injection  After being injected, make sure you don t injure the treated region. But stay active. Enjoy a walk or some other mild activity. Just be careful not to strain the region that gave you trouble.  The next day  Some people feel more pain after being injected. This is normal, and it will go away soon. Applying ice for 20 minutes at a time to your injury may reduce the increased pain. Rest for the first day or two. You don t need to stay in bed. But avoid tasks that may strain the injured region.  If you have diabetes  Cortisone injections can cause blood sugar to be increased for several days after the injection. If you have diabetes, you should follow your blood  sugar closely during this time. Follow your regular plan for what to do when your blood sugar is elevated.     8369-2593 [employer]. [address] [place] [address], [place] [address]. All rights reserved. This information is not intended as a substitute for professional medical care. Always follow your healthcare professional's instructions.     Hamstring Stretch (Flexibility)    1. Sit on the floor with your right leg straight in front of you. Bend your left leg so the sole of your foot rests against the inside of your right thigh.  2. Reach toward your ankle. Keep your knee, neck, and back straight. Feel the stretch in the back of your thigh.  3. Hold for 30 to 60 seconds. Repeat 2 times, or as instructed.  4. Switch legs and repeat.  5. Repeat this exercise 3 times per day, or as  instructed.      Tip: Don t bounce while you re stretching.   Date Last Reviewed: 3/10/2016    8140-4270 [employer]. [address] [place] [address]. All rights reserved. This information is not intended as a substitute for professional medical care. Always follow your healthcare professional's instructions.       Quadriceps Stretch (Flexibility)    6. Stand up straight and hold onto a wall, sturdy chair, railing, or table with your right hand.  7. Bend your left leg at the knee behind you, and grab your ankle with your left hand. Pull your left heel toward your buttocks. Don t arch your back.  8. Hold for 30 to 60 seconds. Repeat 2 times.  9. Switch legs and repeat.  Date Last Reviewed: 5/1/2016 2000-2017 [employer]. [address] [place] [address]. All rights reserved. This information is not intended as a substitute for professional medical care. Always follow your healthcare professional's instructions.      Wall Squats for ACL Healing    After you regain muscle control, it s time to build strength. This helps you put full weight on your leg. For best results, warm up and stretch before starting. If your injury is recent, wait until swelling and pain decrease before doing this exercise:    Lean against a wall with your feet hip-width apart. Your feet should be about 18 inches from the wall.    Slowly slide down to a near-sitting position. Don t let your knees go past 90 degrees.    Hold for 10 seconds, then slide back up.    Repeat 5 times.     CAUTION: Do this exercise only if your healthcare provider says it s OK.     7872-0729 [employer]. [address] [place] [address]. All rights reserved. This information is not intended as a substitute for professional medical care. Always follow your healthcare professional's instructions.        Leg and Knee Exercises: Leg Raise    This exercise is designed to stretch and strengthen your knee.  Before beginning, read through all the instructions. While exercising, breathe normally and use smooth movements. If you feel any pain, stop the exercise. If pain persists, call your healthcare provider.  Caution    Don t arch your back.    Don t hunch your shoulders.     Sit on the floor with your_________ leg straight, the other bent.    Tighten the thigh muscles on the top of your straight leg. You should feel the muscles contract. Raise that leg 6-8 inches. Then lower it slowly and steadily to the floor. Relax.    Repeat ______ times.  Do ______ sets a day.    3162-2470 [employer]. [address] [place] [address]. All rights reserved. This information is not intended as a substitute for professional medical care. Always follow your healthcare professional's instructions.        Quad Set for Leg and Knee    This exercise is designed to stretch and strengthen your knee. Before beginning, read through all the instructions. While exercising, breathe normally and use smooth movements. If you feel any pain, stop the exercise. If pain persists, call your healthcare provider.  1.  Sit on the floor with one leg straight, the other bent.  2.  Flex the foot of your straight leg by pointing your toes toward you. Press the back of your knee into the floor while tightening the muscle on the top of your thigh. Hold for ______ seconds. Then relax.  3.  Repeat ______ times. Do ______ sets a day.  Caution    Don t arch your back.    Don t hunch your shoulders.    4919-4120 [employer]. [address] [place] [address]. All rights reserved. This information is not intended as a substitute for professional medical care. Always follow your healthcare professional's instructions.         [employer] and [employer] may offer reliable information regarding your diagnosis and treatment plan.    THANK YOU for coming in today. If you receive a survey via [employer] or mail please let us know if  there was anything you especially appreciated today or if there is any way we can improve our clinic. We appreciate your input.    GENERAL INFORMATION:  Our hours are:  (Pending)    [facility] for any issues or concerns: [phone]      We are not in the office Thursdays. Therefore non- urgent calls and medical messages received on Thursday will be addressed when we are back in the office on Wednesday. Urgent matters will be reviewed and addressed by one of our partners in the office as needed.    If lab work was done today as part of your evaluation you will generally be contacted via [employer], mail, or phone with the results within 1-5 days. If there is an alarming result we will contact you by phone. Lab results come back at varying times, I generally wait until all labs are resulted before making comments on results. Please note labs are automatically released to [employer] (if you have signed up for it) once available-at times you may see these prior to my having a chance to review them as well.    If you need refills please contact your pharmacist. They will send a refill request to me to review. Please allow 3 business days for us to process all refill requests. All narcotic refills should be handled in the clinic at the time of your visit.

## 2020-04-14 NOTE — LETTER
4/14/2020         RE: Aleks Sumner  [address]  [facility] [address]        Dear Colleague,    Thank you for referring your patient, Aleks Sumner, to the [facility]. Please see a copy of my visit note below.    Orthopedic Clinic Post-Operative Note    CHIEF COMPLAINT:   Chief Complaint   Patient presents with     Consult     (5w4d) stiffness, swelling, pain 5; WORK COMP 1/13/2020; DOS: 3/6/2020~left knee arthroscopy with partial medial menisectomy       HISTORY OF PRESENT ILLNESS  Location: Left knee medial and posterior  Rating of Pain: Mild to moderate  Pain is better with: Rest ice  Pain improving overall: It was after surgery but then got worse as time went on.  Associated Features: Medial knee pain.  Going down stairs or up stairs.  Stiffness with flexion  Patient concerns: Wondering if the knee is better or going to get better.  Wondering about the Baker's cyst.  Wanting to have the cyst drained.  Additional History: Patient denies any numbness or tingling.  Patient denies any catching or locking of the knee.    Patient's past medical, surgical, social and family histories reviewed.     Past Medical History:   Diagnosis Date     NO ACTIVE PROBLEMS        Past Surgical History:   Procedure Laterality Date     APPENDECTOMY  1983    ruptured appy     ARTHROSCOPY KNEE WITH MEDIAL MENISCECTOMY Left 3/6/2020    Procedure: left knee arthroscopy with partial medial menisectomy;  Surgeon: Burak Chaparro MD;  Location: [facility]       Medications:  IBUPROFEN PO, Take 200 mg by mouth  HYDROcodone-acetaminophen (NORCO) 5-325 MG tablet, Take 1-2 tablets by mouth every 4 hours as needed for moderate to severe pain  senna-docusate (SENOKOT-S/PERICOLACE) 8.6-50 MG tablet, Take 1-2 tablets by mouth 2 times daily    No current facility-administered medications on file prior to visit.       No Known Allergies    Social History     Occupational History     Occupation: Iron  Worker     Employer: [id number]   Tobacco Use     Smoking status: Former Smoker     Packs/day: 0.20     Types: Cigarettes     Smokeless tobacco: Former User   Substance and Sexual Activity     Alcohol use: Yes     Alcohol/week: 0.0 standard drinks     Comment: socially, not heavy     Drug use: No     Sexual activity: Yes     Partners: Female       Family History   Problem Relation Age of Onset     Depression Mother         65     Heart Disease Maternal Grandfather      Other - See Comments Father         66     Other - See Comments Sister         3 yrs younger     Diabetes Paternal Uncle      Obesity Paternal Uncle      Other - See Comments Sister         10 yrs younger     Other - See Comments Daughter         2010     Other - See Comments Son         2011     Other - See Comments Son         2013     Alzheimer Disease Maternal Grandmother      Alzheimer Disease Paternal Grandmother      Other - See Comments Daughter         2001, from another marriage       REVIEW OF SYSTEMS  General: negative for, night sweats, dizziness, fatigue  Resp: No shortness of breath and no cough  CV: negative for chest pain, syncope or near-syncope  GI: negative for nausea, vomiting and diarrhea  : negative for dysuria and hematuria  Musculoskeletal: as above  Neurologic: negative for syncope   Hematologic: negative for bleeding disorder    Physical Exam:  Vitals: /68 (BP Location: Right arm, Cuff Size: Adult Regular)   Temp 98.1  F (36.7  C) (Temporal)   Ht 1.829 m (6')   Wt 78.5 kg (173 lb)   BMI 23.46 kg/m    BMI= Body mass index is 23.46 kg/m .  Constitutional: healthy, alert and no acute distress   Psychiatric: mentation appears normal and affect normal/bright  NEURO: no focal deficits, CMS intact left lower extremity  RESP: Normal with easy respirations and no use of accessory muscles to breathe, no audible wheezing or retractions  CV: Calf soft and nontender to palpation, leg warm   SKIN: No erythema, rashes,  excoriation, or breakdown. No evidence of infection.  Surgical incisions to portal incisions completely healed.  No drainage no erythema no swelling.  MUSCULOSKELETAL:    INSPECTION of left knee: Very very slight swelling when compared to the contralateral side.  No gross deformities, erythema, edema, ecchymosis, atrophy or fasciculations.     PALPATION: Slight tenderness to palpation just superior to the medial joint line over the MCL area.  No tenderness lateral, anterior and posterior portion of the knee. No specific joint line tenderness. No increased warmth.  No effusion.  Possibly some fullness on the posterior knee when compared to the contralateral side    ROM: Extension full, flexion to 120 . All range of motion without catching, locking or pain.  Patient did report some fullness with full flexion. I was able to palpate some crepitus with range of motion over the patella area.    STRENGTH: 5 out of 5 quad and hamstring.     SPECIAL TEST: Patient has a negative Lachman's negative drawer sign. Patient's knee is stable to varus and valgus stress at 30  of flexion however the patient does have pain with valgus stress. Patient has a negative Teresa's.   GAIT: non-antalgic  Lymph: no palpable lymph nodes    Diagnostic Modalities:  No radiographs necessary today.      Impression: 1.  1 month 8 days status post left knee arthroscopic partial medial meniscectomy by Dr. Chaparro.  2.  Left knee degenerative joint disease, patellofemoral and medial and lateral, mild.  3.  Left knee popliteal cyst.  4.  Left knee MCL strain                                          INJECTION PROCEDURE:  The patient was counseled about an  injection, including discussion of risks (including infection), contents of the injection, rationale for performing the injection, and expected benefits of the injection. The skin was prepped with alcohol and betadine and then utilizing sterile technique an injection of the left knee joint from the  anterolateral approach in the seated position was performed. I used gifty chloride spray prior to doing the injection. The injection consisted 1ml of Kenalog (40mg per 1ml) with 8ml 1% lidocaine plain. The patient tolerated the injection well, and there were no complications. The injection site was covered with a Band-Aid. The injection was performed by Bebeto Monique PA-C     Plan:   Patient Instructions:   1.  Exam: On exam it does seem that your MCL is irritated.  The range of motion is okay and there is slight swelling but not enough to drain the knee.  I do feel like he may be still dealing with a Baker's cyst or popliteal cyst also.  This should resolve on its own.  2.  Brace: We decided to do a hinged knee brace for your MCL.  It is possible that the MCL did get stressed during surgery.  I would like you to wear this for 4 to 6 weeks.  Wear it at all times except for hygiene.  That should help at night also.  3.  Medication: We talked about doing Mobic or an anti-inflammatory medication.  We will hold on this because we are doing a cortisone injection.  You can call us back if you would like to add this medication on and we can send it to your pharmacy.  4.  Cortisone injection: We decided to do a cortisone injection today.  This is like putting a high powerful anti-inflammatory into your knee.  This should help with swelling and also calm down some of your arthritis which I do believe got irritated during surgery.  5.  Arthritis: You have areas of arthritis in your knee under your kneecap and also on the inside and outside of your knee.  We discussed this today.  This could flareup here and there.  The cortisone injection should help with that.  6.  Work: Today we wrote a work note that he can go back to work tomorrow with the following restrictions, must wear brace at all times for the next 4 weeks, no  kneeling, ladders, squatting for the next 2 weeks.  Once the restrictions have resolved then you can go back  to work without restrictions.  7.  Therapy: We will have you do straight leg raising to help strengthen your quad and pull your patella in a better position.  This will help with your anterior knee pain.  8. Follow up with Kalen Monique PA-C on an as needed basis.   Re-x-ray on return: No    BP Readings from Last 1 Encounters:   04/14/20 120/68       BP noted to be well controlled today in office.      Patient does not use Tobacco products.    This note was dictated with [employer].    Kalen Monique PA-C            Clinic Administered Medication Documentation      Injection Documentation     Injection was administered by provider (please see MAR for given by information). Please see MAR and medication order for additional information.     Site: Joint injection   Medication Used: Kenaolg 40 mg    Expiration Date:  10/2021                Again, thank you for allowing me to participate in the care of your patient.        Sincerely,        Kalen Monique PA-C

## 2020-04-14 NOTE — PROGRESS NOTES
Clinic Administered Medication Documentation      Injection Documentation     Injection was administered by provider (please see MAR for given by information). Please see MAR and medication order for additional information.     Site: Joint injection   Medication Used: Kenaolg 40 mg    Expiration Date:  10/2021

## 2020-04-14 NOTE — LETTER
[address] [address]  [phone]          April 14, 2020    RE:  Aleks Sumner                                                                                                                                                       [address]  [facility] [address]            To whom it may concern:    RE: Aleks Sumner    This patient had knee surgery on 3/6/2020.  The surgery was done by Dr. Chaparro who is no longer at [facility].   Left knee injection today.    Patient dispensed a hinged knee brace that he should wear at all times for 1 month.  No kneeling, squatting, ladders for 2 weeks.  After these time restrictions are completed may return to work w/o restrictions.   Please contact me for questions or concerns.        Sincerely,        Bebeto Monique PA-C

## 2020-04-14 NOTE — PROGRESS NOTES
Orthopedic Clinic Post-Operative Note    CHIEF COMPLAINT:   Chief Complaint   Patient presents with     Consult     (5w4d) stiffness, swelling, pain 5; WORK COMP 1/13/2020; DOS: 3/6/2020~left knee arthroscopy with partial medial menisectomy       HISTORY OF PRESENT ILLNESS  Location: Left knee medial and posterior  Rating of Pain: Mild to moderate  Pain is better with: Rest ice  Pain improving overall: It was after surgery but then got worse as time went on.  Associated Features: Medial knee pain.  Going down stairs or up stairs.  Stiffness with flexion  Patient concerns: Wondering if the knee is better or going to get better.  Wondering about the Baker's cyst.  Wanting to have the cyst drained.  Additional History: Patient denies any numbness or tingling.  Patient denies any catching or locking of the knee.    Patient's past medical, surgical, social and family histories reviewed.     Past Medical History:   Diagnosis Date     NO ACTIVE PROBLEMS        Past Surgical History:   Procedure Laterality Date     APPENDECTOMY  1983    ruptured appy     ARTHROSCOPY KNEE WITH MEDIAL MENISCECTOMY Left 3/6/2020    Procedure: left knee arthroscopy with partial medial menisectomy;  Surgeon: Burak Chaparro MD;  Location: [facility] OR       Medications:  IBUPROFEN PO, Take 200 mg by mouth  HYDROcodone-acetaminophen (NORCO) 5-325 MG tablet, Take 1-2 tablets by mouth every 4 hours as needed for moderate to severe pain  senna-docusate (SENOKOT-S/PERICOLACE) 8.6-50 MG tablet, Take 1-2 tablets by mouth 2 times daily    No current facility-administered medications on file prior to visit.       No Known Allergies    Social History     Occupational History     Occupation:      Employer: [id number]   Tobacco Use     Smoking status: Former Smoker     Packs/day: 0.20     Types: Cigarettes     Smokeless tobacco: Former User   Substance and Sexual Activity     Alcohol use: Yes     Alcohol/week: 0.0 standard drinks     Comment:  socially, not heavy     Drug use: No     Sexual activity: Yes     Partners: Female       Family History   Problem Relation Age of Onset     Depression Mother         65     Heart Disease Maternal Grandfather      Other - See Comments Father         66     Other - See Comments Sister         3 yrs younger     Diabetes Paternal Uncle      Obesity Paternal Uncle      Other - See Comments Sister         10 yrs younger     Other - See Comments Daughter         2010     Other - See Comments Son         2011     Other - See Comments Son         2013     Alzheimer Disease Maternal Grandmother      Alzheimer Disease Paternal Grandmother      Other - See Comments Daughter         2001, from another marriage       REVIEW OF SYSTEMS  General: negative for, night sweats, dizziness, fatigue  Resp: No shortness of breath and no cough  CV: negative for chest pain, syncope or near-syncope  GI: negative for nausea, vomiting and diarrhea  : negative for dysuria and hematuria  Musculoskeletal: as above  Neurologic: negative for syncope   Hematologic: negative for bleeding disorder    Physical Exam:  Vitals: /68 (BP Location: Right arm, Cuff Size: Adult Regular)   Temp 98.1  F (36.7  C) (Temporal)   Ht 1.829 m (6')   Wt 78.5 kg (173 lb)   BMI 23.46 kg/m    BMI= Body mass index is 23.46 kg/m .  Constitutional: healthy, alert and no acute distress   Psychiatric: mentation appears normal and affect normal/bright  NEURO: no focal deficits, CMS intact left lower extremity  RESP: Normal with easy respirations and no use of accessory muscles to breathe, no audible wheezing or retractions  CV: Calf soft and nontender to palpation, leg warm   SKIN: No erythema, rashes, excoriation, or breakdown. No evidence of infection.  Surgical incisions to portal incisions completely healed.  No drainage no erythema no swelling.  MUSCULOSKELETAL:    INSPECTION of left knee: Very very slight swelling when compared to the contralateral side.  No  gross deformities, erythema, edema, ecchymosis, atrophy or fasciculations.     PALPATION: Slight tenderness to palpation just superior to the medial joint line over the MCL area.  No tenderness lateral, anterior and posterior portion of the knee. No specific joint line tenderness. No increased warmth.  No effusion.  Possibly some fullness on the posterior knee when compared to the contralateral side    ROM: Extension full, flexion to 120 . All range of motion without catching, locking or pain.  Patient did report some fullness with full flexion. I was able to palpate some crepitus with range of motion over the patella area.    STRENGTH: 5 out of 5 quad and hamstring.     SPECIAL TEST: Patient has a negative Lachman's negative drawer sign. Patient's knee is stable to varus and valgus stress at 30  of flexion however the patient does have pain with valgus stress. Patient has a negative Teresa's.   GAIT: non-antalgic  Lymph: no palpable lymph nodes    Diagnostic Modalities:  No radiographs necessary today.      Impression: 1.  1 month 8 days status post left knee arthroscopic partial medial meniscectomy by Dr. Chaparro.  2.  Left knee degenerative joint disease, patellofemoral and medial and lateral, mild.  3.  Left knee popliteal cyst.  4.  Left knee MCL strain                                          INJECTION PROCEDURE:  The patient was counseled about an  injection, including discussion of risks (including infection), contents of the injection, rationale for performing the injection, and expected benefits of the injection. The skin was prepped with alcohol and betadine and then utilizing sterile technique an injection of the left knee joint from the anterolateral approach in the seated position was performed. I used gifty chloride spray prior to doing the injection. The injection consisted 1ml of Kenalog (40mg per 1ml) with 8ml 1% lidocaine plain. The patient tolerated the injection well, and there were no  complications. The injection site was covered with a Band-Aid. The injection was performed by Bebeto Monique PA-C     Plan:   Patient Instructions:   1.  Exam: On exam it does seem that your MCL is irritated.  The range of motion is okay and there is slight swelling but not enough to drain the knee.  I do feel like he may be still dealing with a Baker's cyst or popliteal cyst also.  This should resolve on its own.  2.  Brace: We decided to do a hinged knee brace for your MCL.  It is possible that the MCL did get stressed during surgery.  I would like you to wear this for 4 to 6 weeks.  Wear it at all times except for hygiene.  That should help at night also.  3.  Medication: We talked about doing Mobic or an anti-inflammatory medication.  We will hold on this because we are doing a cortisone injection.  You can call us back if you would like to add this medication on and we can send it to your pharmacy.  4.  Cortisone injection: We decided to do a cortisone injection today.  This is like putting a high powerful anti-inflammatory into your knee.  This should help with swelling and also calm down some of your arthritis which I do believe got irritated during surgery.  5.  Arthritis: You have areas of arthritis in your knee under your kneecap and also on the inside and outside of your knee.  We discussed this today.  This could flareup here and there.  The cortisone injection should help with that.  6.  Work: Today we wrote a work note that he can go back to work tomorrow with the following restrictions, must wear brace at all times for the next 4 weeks, no  kneeling, ladders, squatting for the next 2 weeks.  Once the restrictions have resolved then you can go back to work without restrictions.  7.  Therapy: We will have you do straight leg raising to help strengthen your quad and pull your patella in a better position.  This will help with your anterior knee pain.  8. Follow up with Kalen Monique PA-C on an as needed  basis.   Re-x-ray on return: No    BP Readings from Last 1 Encounters:   04/14/20 120/68       BP noted to be well controlled today in office.      Patient does not use Tobacco products.    This note was dictated with [employer].    Kalen Monique PA-C

## 2020-04-28 RX ORDER — MELOXICAM 15 MG/1
15 TABLET ORAL DAILY
Qty: 30 TABLET | Refills: 1 | Status: SHIPPED | OUTPATIENT
Start: 2020-04-28 | End: 2020-09-17

## 2020-04-28 NOTE — TELEPHONE ENCOUNTER
VM left for patient to call back. When patient calls back -confirm with patient what he needs for the new workability letter to state or if we are able to use the workability letter from 04-................    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)

## 2020-04-28 NOTE — TELEPHONE ENCOUNTER
Pt calling and needs updated work ability letter, please call to discuss and send to [email or web address] when completed. Please call pt to talk about the updates of the work ability. He can be reached at [phone]. Thank You Meme

## 2020-04-28 NOTE — TELEPHONE ENCOUNTER
Thank you Safia, note I reviewed and looks excellent and I have signed it and gave it back to Safia.  I signed the prescription.  Thank you.

## 2020-04-28 NOTE — TELEPHONE ENCOUNTER
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

## 2020-05-12 ENCOUNTER — TELEPHONE (OUTPATIENT)
Dept: ORTHOPEDICS | Facility: OTHER | Age: 45
End: 2020-05-12

## 2020-05-12 NOTE — TELEPHONE ENCOUNTER
Patient is calling and is requesting a work note stating that he is done with the restrictions and that he is ok to wear his brace as needed. Patient stated that his knee is doing ok and that he only has some pain at night.     Please send letter to [email or web address].     Routing to provider to see if letter is appropriate.   Viji Friend CMA on 5/12/2020 at 12:53 PM

## 2020-05-12 NOTE — TELEPHONE ENCOUNTER
Reason for Call:  Other call back    Detailed comments: 12-12-30 or after 3:30 Please call pt as he wants to talk to Kayden about a return to work note.     Phone Number Patient can be reached at: Home number on file [phone] (home)    Best Time: NA    Can we leave a detailed message on this number? YES    Call taken on 5/12/2020 at 9:34 AM by Margie Espitia

## 2020-05-12 NOTE — LETTER
[facility]  [address]  [place] [place] [facility] [address]  Phone: [phone]    May 13, 2020        Aleks Sumner  [address]  [facility] [address]          To whom it may concern:    RE: Aleks GUZMAN Sandee Sumner    This patient can return to work without restrictions as of 5/14/2020.  Patient can wear his knee brace as needed per the patient.    Please contact me for questions or concerns.      Sincerely,        Kalen Monique PA-C

## 2020-05-12 NOTE — TELEPHONE ENCOUNTER
I called and left a voice mail for patient to please give us a call back regarding his request for a work letter.

## 2020-05-13 NOTE — TELEPHONE ENCOUNTER
I called the patient just to find out specifically what date he wanted me to say this for.  I was unable to get a hold of him so I just wrote the note stating it would be start tomorrow. I handed it off to Laura LEMONS.  She is helping me with emailing it.

## 2020-06-09 ENCOUNTER — TELEPHONE (OUTPATIENT)
Dept: ORTHOPEDICS | Facility: CLINIC | Age: 45
End: 2020-06-09

## 2020-06-09 NOTE — TELEPHONE ENCOUNTER
Reason for Call:  Form, our goal is to have forms completed with 72 hours, however, some forms may require a visit or additional information.    Type of letter, form or note:  medical    Who is the form from?: [employer]     Where did the form come from: form was faxed in    What clinic location was the form placed at?: [place]    Where the form was placed: Given to physician- dr Kathrin vargas [facility]     What number is listed as a contact on the form?:   Additional comments:     Call taken on 6/9/2020 at 3:37 PM by Meme Young

## 2020-06-10 NOTE — TELEPHONE ENCOUNTER
Form filled out and emailed to [email or web address]  Put in faxed items folder and copy sent to scan.

## 2020-09-17 ENCOUNTER — OFFICE VISIT (OUTPATIENT)
Dept: INTERNAL MEDICINE | Facility: CLINIC | Age: 45
End: 2020-09-17
Payer: COMMERCIAL

## 2020-09-17 ENCOUNTER — NURSE TRIAGE (OUTPATIENT)
Dept: FAMILY MEDICINE | Facility: CLINIC | Age: 45
End: 2020-09-17

## 2020-09-17 VITALS
HEART RATE: 76 BPM | TEMPERATURE: 98.1 F | OXYGEN SATURATION: 99 % | RESPIRATION RATE: 18 BRPM | HEIGHT: 72 IN | DIASTOLIC BLOOD PRESSURE: 66 MMHG | SYSTOLIC BLOOD PRESSURE: 116 MMHG | WEIGHT: 167 LBS | BODY MASS INDEX: 22.62 KG/M2

## 2020-09-17 DIAGNOSIS — N41.0 ACUTE PROSTATITIS: Primary | ICD-10-CM

## 2020-09-17 PROCEDURE — 99213 OFFICE O/P EST LOW 20 MIN: CPT | Performed by: INTERNAL MEDICINE

## 2020-09-17 RX ORDER — SULFAMETHOXAZOLE/TRIMETHOPRIM 800-160 MG
TABLET ORAL
Qty: 28 TABLET | Refills: 0 | Status: SHIPPED | OUTPATIENT
Start: 2020-09-17

## 2020-09-17 RX ORDER — CIPROFLOXACIN 500 MG/1
TABLET, FILM COATED ORAL
Qty: 28 TABLET | Refills: 0 | Status: SHIPPED | OUTPATIENT
Start: 2020-09-17 | End: 2020-10-13

## 2020-09-17 ASSESSMENT — PAIN SCALES - GENERAL: PAINLEVEL: MODERATE PAIN (4)

## 2020-09-17 ASSESSMENT — MIFFLIN-ST. JEOR: SCORE: 1675.51

## 2020-09-17 NOTE — TELEPHONE ENCOUNTER
"S-(situation): C/O discomfort in his mid to lower abdomen and bilateral groin area.     B-(background): It started about a week ago. 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 He doesn't recall feeling any immediate discomfort after lifting things. He notices mild discomfort in lower abdomen, from wait to groin area, sometimes when pushing BM or urinating. NOT too often, but will notice at times. Denies STD SX. Or exposure.    A-(assessment): groin pain of unknown etiology    R-(recommendations): See provider in office. Has appt today with Dr. Chan at 3:40PM  DAMASO Ramos      Additional Information    Negative: Sounds like a life-threatening emergency to the triager    Negative: Pain or burning with passing urine (urination) is main symptom    Negative: Pubic lice suspected    Negative: STD exposure and prevention, question about    Negative: Pain in scrotum or testicle is main symptom    Negative: Swollen scrotum OR lump in the scrotum/groin area    Negative: Large amount of blood from end of penis    Negative: Foreskin pulled back and stuck (not circumcised)    Negative: Fever > 100.5 F (38.1 C)    Negative: Unable to urinate (or only a few drops) and bladder feels very full    Negative: Painful erection present > 2 hours    Negative: Patient sounds very sick or weak to the triager    Negative: SEVERE pain or burning with passing urine (urination)    Negative: Entire penis is swollen (i.e., edema)    Negative: Looks infected (e.g., draining sore, spreading redness)    Pain or burning with passing urine (urination)    Answer Assessment - Initial Assessment Questions  1. SYMPTOM: \"What's the main symptom you're concerned about?\" (e.g., discharge from penis, rash, pain, itching, swelling)      C/O discomfort in his groin area and lower abdomen, between his belt and his groin area.   2. LOCATION: \"Where is the pain located?\"  Left side abd and under rib cage, down to testicles.        3. ONSET: " "\"When did  Start?\"         Started about a week ago. He does heavy lifting at work . Construction.    4. PAIN: \"Is there any pain?\" If so, ask: \"How bad is it?\"  (Scale 1-10; or mild, moderate, severe)      3/10- dull ache, but if straining with BM goes higher.     5. URINE: \"Any difficulty passing urine?\" If so, ask: \"When was the last time?\"       NO difficulty passing his urine. NO blood in urine.    6. CAUSE: \"What do you think is causing the symptoms?\"      Etiology unknown    7. OTHER SYMPTOMS: \"Do you have any other symptoms?\" (e.g., fever, abdominal pain, blood in urine)        NO temp, at least not higher than 99.2. just one day, after home from work.   NO N, V , D.    Protocols used: PENIS AND SCROTUM SYMPTOMS-A-[place]    "

## 2020-09-17 NOTE — PROGRESS NOTES
Subjective     Aleks Sumner is a 44 year old male who presents to clinic today for the following health issues:    HPI       Groin pain, Prostate?  Pressure when he has a BM has groin pain for 1 week. Weak stream of urine.  Hurts to sit.  Standing is better.          EMR reviewed including: History of chief complaint, pertinent distant history, medications, concurrent diagnoses.             Chief Complaint:  #1   Groin pain, nonspecific bilateral testicular pain.  Sensation of needing to urinate  Mild perineal discomfort.  Mild retropubic discomfort, persistent for greater than 1 week.  No hematuria or hematospermia noted.  New onset urinary hesitance and decreased stream caliber.  Denies fevers or chills at this time.  Positive night sweats for the last week                       PAST, FAMILY,SOCIAL HISTORY:     Medical  History:   has a past medical history of NO ACTIVE PROBLEMS.     Surgical History:   has a past surgical history that includes appendectomy (1983) and Arthroscopy knee with medial meniscectomy (Left, 3/6/2020).     Social History:   reports that he has quit smoking. His smoking use included cigarettes. He smoked 0.20 packs per day. He has quit using smokeless tobacco. He reports current alcohol use. He reports that he does not use drugs.     Family History:  family history includes Alzheimer Disease in his maternal grandmother and paternal grandmother; Depression in his mother; Diabetes in his paternal uncle; Heart Disease in his maternal grandfather; Obesity in his paternal uncle; Other - See Comments in his daughter, daughter, father, sister, sister, son, and son.            MEDICATIONS  Current Outpatient Medications   Medication Sig Dispense Refill     ciprofloxacin (CIPRO) 500 MG tablet Take 1 pill twice a day for 2 weeks. Take first. 28 tablet 0     IBUPROFEN PO Take 200 mg by mouth       sulfamethoxazole-trimethoprim (BACTRIM DS) 800-160 MG tablet Take 1 pill twice a day for 2  "weeks. Take second 28 tablet 0         --------------------------------------------------------------------------------------------------------------------                              Review of Systems     LUNGS: Pt denies: cough, excess sputum, hemoptysis, or shortness of breath.   HEART: Pt denies: chest pain, arrhythmia, syncope, tachy or bradyarrhythmia.   GI: Pt denies: nausea, vomiting, diarrhea, constipation, melena, or hematochezia.   NEURO: Pt denies: seizures, strokes, diplopia, weakness, paraesthesias, or paralysis.      Examination       /66   Pulse 76   Temp 98.1  F (36.7  C) (Temporal)   Resp 18   Ht 1.829 m (6')   Wt 75.8 kg (167 lb)   SpO2 99%   BMI 22.65 kg/m      Constitutional: The patient appears to be in no acute distress. The patient appears to be adequately hydrated. No acute respiratory or hemodynamic distress is noted at this time.   LUNGS: clear bilaterally, airflow is brisk, no intercostal retraction or stridor is noted. No coughing is noted during visit.   HEART:  regular without rubs, clicks, gallops, or murmurs. PMI is nondisplaced. Upstrokes are brisk. S1,S2 are heard.   GENITAL: Normal male genitalia is noted. Testicles are bilaterally present. No penile lesions are present.     GI: Abdomen is soft, without rebound, guarding or tenderness. Bowel sounds are appropriate. No renal bruits are heard.       Decision Making       1. Acute prostatitis  Recommend Tylenol/ibuprofen.  Cipro for 2 weeks followed by Bactrim for 2 weeks.  \"Clean pipes\" more frequently.  Discussed  - ciprofloxacin (CIPRO) 500 MG tablet; Take 1 pill twice a day for 2 weeks. Take first.  Dispense: 28 tablet; Refill: 0  - sulfamethoxazole-trimethoprim (BACTRIM DS) 800-160 MG tablet; Take 1 pill twice a day for 2 weeks. Take second  Dispense: 28 tablet; Refill: 0                             FOLLOW UP   I have asked the patient to make an appointment for followup with me by phone in 2 weeks with progress.  " Patient will follow-up with primary care provider as scheduled/needed            I have carefully explained the diagnosis and treatment options to the patient.  The patient has displayed an understanding of the above, and all subsequent questions were answered.      DO EKTA Paredes    Portions of this note were produced using [employer]  Although every attempt at real-time proof reading has been made, occasional grammar/syntax errors may have been missed.

## 2020-09-17 NOTE — TELEPHONE ENCOUNTER
Reason for call:  Patient reporting a symptom    Symptom or request: Lower abdominal pain traveling down to testicles - Wife calling for Aleks, he is at work and cannot talk until 12:15pm - 12:25pm.    Duration (how long have symptoms been present): 1 week    Have you been treated for this before? No    Additional comments: Best time to call him today is 12:10pm and 12:25pm today    Phone Number patient can be reached at:  Cell number on file:    Telephone Information:   Mobile [phone]       Best Time:  any    Can we leave a detailed message on this number:  YES    Call taken on 9/17/2020 at 9:46 AM by Lora Meehan

## 2020-10-13 ENCOUNTER — OFFICE VISIT (OUTPATIENT)
Dept: FAMILY MEDICINE | Facility: CLINIC | Age: 45
End: 2020-10-13
Payer: COMMERCIAL

## 2020-10-13 VITALS
OXYGEN SATURATION: 98 % | HEART RATE: 71 BPM | DIASTOLIC BLOOD PRESSURE: 74 MMHG | SYSTOLIC BLOOD PRESSURE: 124 MMHG | RESPIRATION RATE: 16 BRPM | TEMPERATURE: 98.1 F | BODY MASS INDEX: 22.99 KG/M2 | WEIGHT: 169.5 LBS

## 2020-10-13 DIAGNOSIS — N50.819 TESTICULAR DISCOMFORT: ICD-10-CM

## 2020-10-13 DIAGNOSIS — R39.15 URINARY URGENCY: Primary | ICD-10-CM

## 2020-10-13 LAB
ALBUMIN UR-MCNC: NEGATIVE MG/DL
ANION GAP SERPL CALCULATED.3IONS-SCNC: 4 MMOL/L (ref 3–14)
APPEARANCE UR: CLEAR
BASOPHILS # BLD AUTO: 0 10E9/L (ref 0–0.2)
BASOPHILS NFR BLD AUTO: 0.6 %
BILIRUB UR QL STRIP: NEGATIVE
BUN SERPL-MCNC: 17 MG/DL (ref 7–30)
CALCIUM SERPL-MCNC: 8.4 MG/DL (ref 8.5–10.1)
CHLORIDE SERPL-SCNC: 108 MMOL/L (ref 94–109)
CO2 SERPL-SCNC: 25 MMOL/L (ref 20–32)
COLOR UR AUTO: ABNORMAL
CREAT SERPL-MCNC: 1.1 MG/DL (ref 0.66–1.25)
CRP SERPL-MCNC: 6.3 MG/L (ref 0–8)
DIFFERENTIAL METHOD BLD: ABNORMAL
EOSINOPHIL NFR BLD AUTO: 3.3 %
ERYTHROCYTE [DISTWIDTH] IN BLOOD BY AUTOMATED COUNT: 12 % (ref 10–15)
ERYTHROCYTE [SEDIMENTATION RATE] IN BLOOD BY WESTERGREN METHOD: 5 MM/H (ref 0–15)
GFR SERPL CREATININE-BSD FRML MDRD: 80 ML/MIN/{1.73_M2}
GLUCOSE SERPL-MCNC: 98 MG/DL (ref 70–99)
GLUCOSE UR STRIP-MCNC: NEGATIVE MG/DL
HCT VFR BLD AUTO: 42.8 % (ref 40–53)
HGB BLD-MCNC: 14.4 G/DL (ref 13.3–17.7)
HGB UR QL STRIP: NEGATIVE
IMM GRANULOCYTES # BLD: 0 10E9/L (ref 0–0.4)
IMM GRANULOCYTES NFR BLD: 0.4 %
KETONES UR STRIP-MCNC: NEGATIVE MG/DL
LEUKOCYTE ESTERASE UR QL STRIP: NEGATIVE
LYMPHOCYTES # BLD AUTO: 1.5 10E9/L (ref 0.8–5.3)
LYMPHOCYTES NFR BLD AUTO: 26.8 %
MCH RBC QN AUTO: 29.5 PG (ref 26.5–33)
MCHC RBC AUTO-ENTMCNC: 33.6 G/DL (ref 31.5–36.5)
MCV RBC AUTO: 88 FL (ref 78–100)
MONOCYTES # BLD AUTO: 0.5 10E9/L (ref 0–1.3)
MONOCYTES NFR BLD AUTO: 8.8 %
MUCOUS THREADS #/AREA URNS LPF: PRESENT /LPF
NEUTROPHILS # BLD AUTO: 3.3 10E9/L (ref 1.6–8.3)
NEUTROPHILS NFR BLD AUTO: 60.1 %
NITRATE UR QL: NEGATIVE
NRBC # BLD AUTO: 0 10*3/UL
NRBC BLD AUTO-RTO: 0 /100
PH UR STRIP: 6 PH (ref 5–7)
PLATELET # BLD AUTO: 132 10E9/L (ref 150–450)
POTASSIUM SERPL-SCNC: 4.1 MMOL/L (ref 3.4–5.3)
PSA SERPL-ACNC: 0.69 UG/L (ref 0–4)
RBC # BLD AUTO: 4.88 10E12/L (ref 4.4–5.9)
RBC #/AREA URNS AUTO: 0 /HPF (ref 0–2)
SODIUM SERPL-SCNC: 137 MMOL/L (ref 133–144)
SOURCE: ABNORMAL
SP GR UR STRIP: 1 (ref 1–1.03)
UROBILINOGEN UR STRIP-MCNC: 0 MG/DL (ref 0–2)
WBC # BLD AUTO: 5.4 10E9/L (ref 4–11)
WBC #/AREA URNS AUTO: 0 /HPF (ref 0–5)

## 2020-10-13 PROCEDURE — G0103 PSA SCREENING: HCPCS | Performed by: FAMILY MEDICINE

## 2020-10-13 PROCEDURE — 85025 COMPLETE CBC W/AUTO DIFF WBC: CPT | Performed by: FAMILY MEDICINE

## 2020-10-13 PROCEDURE — 80048 BASIC METABOLIC PNL TOTAL CA: CPT | Performed by: FAMILY MEDICINE

## 2020-10-13 PROCEDURE — 85652 RBC SED RATE AUTOMATED: CPT | Performed by: FAMILY MEDICINE

## 2020-10-13 PROCEDURE — 99214 OFFICE O/P EST MOD 30 MIN: CPT | Performed by: FAMILY MEDICINE

## 2020-10-13 PROCEDURE — 86140 C-REACTIVE PROTEIN: CPT | Performed by: FAMILY MEDICINE

## 2020-10-13 PROCEDURE — 81001 URINALYSIS AUTO W/SCOPE: CPT | Performed by: FAMILY MEDICINE

## 2020-10-13 PROCEDURE — 36415 COLL VENOUS BLD VENIPUNCTURE: CPT | Performed by: FAMILY MEDICINE

## 2020-10-13 ASSESSMENT — PAIN SCALES - GENERAL: PAINLEVEL: NO PAIN (1)

## 2020-10-13 NOTE — PROGRESS NOTES
Subjective     Aleks Sumner is a 44 year old male who presents to clinic today for the following health issues:    HPI         Patient was in on 9/17/2020 for a prostate infection and was given 2 weeks of Cipro and then 2 weeks of bactrim. Patient started to feel a little better and then symptoms got worse again. Still have pain in lower abdomen and lower groin and testicle area.     Aleks is here today for follow-up on his prostatitis.  He was seen on 9/17/2020 for suprapelvic discomfort that radiated down to the testicle bilaterally with the urge sensation of needing to urinate.  He had it for about a week before he was seen.  No obvious hematuria or hematospermia.  He was also having urinary frequency with no urgency or dysuria.  He noted decreased stream caliber and post-voidal dribbling. Never had this before.  No history of UTI, kidney stone, okay or prostatitis.  Denied of STD risk.   in a monogamous relationship - denied of STD risk.  Last STD check in 2018 and it was normal.  No penile discharge.  The discomfort is below the umbilical, behind the belt buckle.   Constant, dull and achy discomfort that is getting worse and better throughout the day.  Nothing to make it better or worse- not worse with coughing or straining activities.  Not affecting his daily activities - just know it is there.  Not better or worse with bowel movement or urination.  No bowel urgency.  No hematochezia or melena.  No fever or chills.  Has some lower back pain initially but resolved.    He was treated for acute prostatitis empirically with Cipro for 2 weeks and then followed by Bactrim for 2 weeks - he has about 4 days left.  No lab or UA was done at that time.  Stated that he has been taking the medication as prescribed but has not feel any better.  No improvemen with the antibiotic; Nothing changed except of no longer having the back pain.  Again no fever or chills.  No nausea vomiting. Not affecting his work  or daily activities.    Review of Systems   Constitutional, HEENT, cardiovascular, pulmonary, gi and gu systems are negative, except as otherwise noted.      Objective    /74   Pulse 71   Temp 98.1  F (36.7  C) (Temporal)   Resp 16   Wt 76.9 kg (169 lb 8 oz)   SpO2 98%   BMI 22.99 kg/m    Body mass index is 22.99 kg/m .  Physical Exam   GENERAL: healthy, alert and no distress  RESP: lungs clear to auscultation - no rales, rhonchi or wheezes  CV: regular rate and rhythm, no murmur,   ABDOMEN: soft, non-tender, nondistended, no palpable masses organomegaly with normal bowel sounds.  No CVA tenderness.  No tender with comparison to the suprapelvic area.   (male): normal male genitalia without lesions or urethral discharge, no hernia.  Testes are descended bilaterally, no testicular mass.  No tender with comparison to the vas deferens.  No penile discharge or irritation/redness.  No significant pain or discomfort  RECTAL (male): normal sphincter tone, no rectal masses, prostate normal size, smooth, nontender without nodules or masses.  No pain or discomfort with palpation to this prostate.    MS: no gross musculoskeletal defects noted, no edema.  Hips exam was normal.  No tender with ice in the lumbar spine.  SKIN: no suspicious lesions or rashes    Results for orders placed or performed in visit on 10/13/20   UA with Microscopic reflex to Culture (Florencia Rubio; [facility])     Status: Abnormal    Specimen: Unspecified Urine   Result Value Ref Range    Color Urine Straw     Appearance Urine Clear     Glucose Urine Negative NEG^Negative mg/dL    Bilirubin Urine Negative NEG^Negative    Ketones Urine Negative NEG^Negative mg/dL    Specific Gravity Urine 1.001 (L) 1.003 - 1.035    Blood Urine Negative NEG^Negative    pH Urine 6.0 5.0 - 7.0 pH    Protein Albumin Urine Negative NEG^Negative mg/dL    Urobilinogen mg/dL 0.0 0.0 - 2.0 mg/dL    Nitrite Urine Negative NEG^Negative    Leukocyte Esterase Urine  Negative NEG^Negative    Source Unspecified Urine     WBC Urine 0 0 - 5 /HPF    RBC Urine 0 0 - 2 /HPF    Mucous Urine Present (A) NEG^Negative /LPF   CBC with platelets differential     Status: Abnormal   Result Value Ref Range    WBC 5.4 4.0 - 11.0 10e9/L    RBC Count 4.88 4.4 - 5.9 10e12/L    Hemoglobin 14.4 13.3 - 17.7 g/dL    Hematocrit 42.8 40.0 - 53.0 %    MCV 88 78 - 100 fl    MCH 29.5 26.5 - 33.0 pg    MCHC 33.6 31.5 - 36.5 g/dL    RDW 12.0 10.0 - 15.0 %    Platelet Count 132 (L) 150 - 450 10e9/L    Diff Method Automated Method     % Neutrophils 60.1 %    % Lymphocytes 26.8 %    % Monocytes 8.8 %    % Eosinophils 3.3 %    % Basophils 0.6 %    % Immature Granulocytes 0.4 %    Nucleated RBCs 0 0 /100    Absolute Neutrophil 3.3 1.6 - 8.3 10e9/L    Absolute Lymphocytes 1.5 0.8 - 5.3 10e9/L    Absolute Monocytes 0.5 0.0 - 1.3 10e9/L    Absolute Basophils 0.0 0.0 - 0.2 10e9/L    Abs Immature Granulocytes 0.0 0 - 0.4 10e9/L    Absolute Nucleated RBC 0.0    PROSTATE SPEC ANTIGEN SCREEN     Status: None   Result Value Ref Range    PSA 0.69 0 - 4 ug/L   Basic metabolic panel  (Ca, Cl, CO2, Creat, Gluc, K, Na, BUN)     Status: Abnormal   Result Value Ref Range    Sodium 137 133 - 144 mmol/L    Potassium 4.1 3.4 - 5.3 mmol/L    Chloride 108 94 - 109 mmol/L    Carbon Dioxide 25 20 - 32 mmol/L    Anion Gap 4 3 - 14 mmol/L    Glucose 98 70 - 99 mg/dL    Urea Nitrogen 17 7 - 30 mg/dL    Creatinine 1.10 0.66 - 1.25 mg/dL    GFR Estimate 80 >60 mL/min/[1.73_m2]    GFR Estimate If Black >90 >60 mL/min/[1.73_m2]    Calcium 8.4 (L) 8.5 - 10.1 mg/dL   ESR: Erythrocyte sedimentation rate     Status: None   Result Value Ref Range    Sed Rate 5 0 - 15 mm/h   CRP, inflammation     Status: None   Result Value Ref Range    CRP Inflammation 6.3 0.0 - 8.0 mg/L           Assessment & Plan       ICD-10-CM    1. Urinary urgency  R39.15 UA with Microscopic reflex to Culture (Florencia Rubio; [facility])     CBC with platelets differential      PROSTATE SPEC ANTIGEN SCREEN     Basic metabolic panel  (Ca, Cl, CO2, Creat, Gluc, K, Na, BUN)     ESR: Erythrocyte sedimentation rate     CRP, inflammation   2. Testicular discomfort  N50.819      Aleks has been having urinary urgency with bilateral testicular discomfort in the last 6 weeks.  He was treated empirically for acute prostatitis about a month ago with Cipro followed by Bactrim with no effect.  Overall the symptoms are about the same despite of taking the medication as prescribed physical exam today was pretty normal including the prostate exam.  Labs as ordered and reviewed - normal CRP, sed rate, BMP, PSA and CBC. Vital signs were stable.      I consulted with Dr. Estrada the urologist over the phone.  Dr. Estrada did not think that it is prostatitis but the thinks it is more of the groin strain.  Recommend to stop the antibiotic and monitor.  Tylenol or Motrin as needed for pain.  Follow-up if the symptom persists or gets worse.    I discussed with the patient over the phone after talking Dr. Estrada, the urologist.  I also discussed with him about Dr. Estrada's recommendation.  Patient again said it is not really bothering him.  He is willing to wait for a week or 2, and will call in if not getting any better.  Will refer to urology then.  It was also instructed to call in or follow-up it gets significantly worse.        Return in about 3 months (around 1/13/2021) for Physical Exam.    Cora Canales Mai, MD  [facility]

## 2020-10-25 PROBLEM — S83.512D RUPTURE OF ANTERIOR CRUCIATE LIGAMENT OF LEFT KNEE, SUBSEQUENT ENCOUNTER: Status: RESOLVED | Noted: 2020-02-25 | Resolved: 2020-10-25

## 2021-01-09 ENCOUNTER — HEALTH MAINTENANCE LETTER (OUTPATIENT)
Age: 46
End: 2021-01-09

## 2021-04-07 ENCOUNTER — TELEPHONE (OUTPATIENT)
Dept: ORTHOPEDICS | Facility: CLINIC | Age: 46
End: 2021-04-07

## 2021-04-09 ENCOUNTER — TELEPHONE (OUTPATIENT)
Dept: ORTHOPEDICS | Facility: CLINIC | Age: 46
End: 2021-04-09

## 2021-04-09 NOTE — TELEPHONE ENCOUNTER
Form completed today and called the patient discussed with him.  See phone encounter from today for additional details.  Form given to team to process.

## 2021-04-09 NOTE — TELEPHONE ENCOUNTER
I called the patient to discuss his forms with him.  We have forms asking about permanent partial disability and MMI.  Patient's MMI is 5/14/2020 when we released him back to work without restrictions.  I called the patient today and he is doing well with just some swelling in the back of knee secondary to his popliteal cyst and every once while medial pain but it is very rare secondary to his MCL.  As far as his catching and locking that is nonexistent.  His meniscus seems to not be an issue anymore.  Patient did have a meniscectomy which was a partial medial meniscectomy by Dr. Chaparro on 3/6/2020.  Patient does not have any problems and is not having any problems at work.  Form filled out and given back to our team to process.

## 2021-05-08 ENCOUNTER — HEALTH MAINTENANCE LETTER (OUTPATIENT)
Age: 46
End: 2021-05-08

## 2021-10-23 ENCOUNTER — HEALTH MAINTENANCE LETTER (OUTPATIENT)
Age: 46
End: 2021-10-23

## 2022-06-04 ENCOUNTER — HEALTH MAINTENANCE LETTER (OUTPATIENT)
Age: 47
End: 2022-06-04

## 2022-10-09 ENCOUNTER — HEALTH MAINTENANCE LETTER (OUTPATIENT)
Age: 47
End: 2022-10-09

## 2023-06-10 ENCOUNTER — HEALTH MAINTENANCE LETTER (OUTPATIENT)
Age: 48
End: 2023-06-10

## 2023-07-17 NOTE — TELEPHONE ENCOUNTER
Received request for a Health Care Provider Report to be completed for this patient regarding a DOI of 1/13/20.  The form was filled out with the exception of questions 9 & 10 which are to be completed by the provider.  I have placed the form in KADY Monique's basket to be reviewed, completed, and signed.     When completed this form should be mailed to [employer] as indicated on letter.     Daisy KERR, Lead RN, BSN. . .  4/7/2021, 12:57 PM     none

## (undated) DEVICE — GLOVE PROTEXIS W/NEU-THERA 7.5  2D73TE75

## (undated) DEVICE — PACK KNEE ARTHROSCOPY CUSTOM

## (undated) DEVICE — BLADE DYONICS INCISOR PLUS ELITE 3.5MM 72200095

## (undated) DEVICE — SYR 50ML LL W/O NDL 309653

## (undated) DEVICE — BASIN SET MINOR DISP

## (undated) DEVICE — GLOVE ORTHO 7.5 LATEX 5721314

## (undated) DEVICE — PREP POVIDONE IODINE SWABS X3

## (undated) DEVICE — BLADE KNIFE SURG 15 371115

## (undated) DEVICE — TUBING INFLOW CROSSFLOW 0450-000-100

## (undated) DEVICE — TUBING SUCTION 12"X1/4" N612

## (undated) DEVICE — SOL NACL 0.9% IRRIG 3000ML BAG 07972-08

## (undated) DEVICE — CONNECTOR ONE-LINK INJECTION SITE LF 7N8399

## (undated) DEVICE — PREP CHLORAPREP 26ML TINTED ORANGE  260815

## (undated) DEVICE — SYR 20ML LL W/O NDL 302830

## (undated) DEVICE — DRAPE MAYO STAND 23X54 8337

## (undated) RX ORDER — LIDOCAINE HYDROCHLORIDE 20 MG/ML
INJECTION, SOLUTION EPIDURAL; INFILTRATION; INTRACAUDAL; PERINEURAL
Status: DISPENSED
Start: 2020-03-06

## (undated) RX ORDER — DEXAMETHASONE SODIUM PHOSPHATE 10 MG/ML
INJECTION, SOLUTION INTRAMUSCULAR; INTRAVENOUS
Status: DISPENSED
Start: 2020-03-06

## (undated) RX ORDER — FENTANYL CITRATE 50 UG/ML
INJECTION, SOLUTION INTRAMUSCULAR; INTRAVENOUS
Status: DISPENSED
Start: 2020-03-06

## (undated) RX ORDER — ONDANSETRON 2 MG/ML
INJECTION INTRAMUSCULAR; INTRAVENOUS
Status: DISPENSED
Start: 2020-03-06

## (undated) RX ORDER — PROPOFOL 10 MG/ML
INJECTION, EMULSION INTRAVENOUS
Status: DISPENSED
Start: 2020-03-06

## (undated) RX ORDER — KETOROLAC TROMETHAMINE 30 MG/ML
INJECTION, SOLUTION INTRAMUSCULAR; INTRAVENOUS
Status: DISPENSED
Start: 2020-03-06